# Patient Record
Sex: MALE | Race: WHITE | NOT HISPANIC OR LATINO
[De-identification: names, ages, dates, MRNs, and addresses within clinical notes are randomized per-mention and may not be internally consistent; named-entity substitution may affect disease eponyms.]

---

## 2017-03-27 ENCOUNTER — RX RENEWAL (OUTPATIENT)
Age: 67
End: 2017-03-27

## 2017-06-05 ENCOUNTER — INPATIENT (INPATIENT)
Facility: HOSPITAL | Age: 67
LOS: 1 days | Discharge: HOME | DRG: 378 | End: 2017-06-07
Attending: STUDENT IN AN ORGANIZED HEALTH CARE EDUCATION/TRAINING PROGRAM | Admitting: STUDENT IN AN ORGANIZED HEALTH CARE EDUCATION/TRAINING PROGRAM
Payer: MEDICARE

## 2017-06-05 VITALS
RESPIRATION RATE: 18 BRPM | SYSTOLIC BLOOD PRESSURE: 126 MMHG | HEART RATE: 125 BPM | DIASTOLIC BLOOD PRESSURE: 90 MMHG | TEMPERATURE: 99 F | WEIGHT: 190.04 LBS | OXYGEN SATURATION: 100 % | HEIGHT: 72 IN

## 2017-06-05 LAB
ALBUMIN SERPL ELPH-MCNC: 3.1 G/DL — LOW (ref 3.3–5)
ALP SERPL-CCNC: 84 U/L — SIGNIFICANT CHANGE UP (ref 40–120)
ALT FLD-CCNC: 32 U/L — SIGNIFICANT CHANGE UP (ref 10–45)
ANION GAP SERPL CALC-SCNC: 15 MMOL/L — SIGNIFICANT CHANGE UP (ref 5–17)
APTT BLD: 32.2 SEC — SIGNIFICANT CHANGE UP (ref 27.5–37.4)
AST SERPL-CCNC: 59 U/L — HIGH (ref 10–40)
BASOPHILS NFR BLD AUTO: 0.2 % — SIGNIFICANT CHANGE UP (ref 0–2)
BILIRUB SERPL-MCNC: 2.9 MG/DL — HIGH (ref 0.2–1.2)
BLD GP AB SCN SERPL QL: NEGATIVE — SIGNIFICANT CHANGE UP
BUN SERPL-MCNC: 31 MG/DL — HIGH (ref 7–23)
CALCIUM SERPL-MCNC: 8.8 MG/DL — SIGNIFICANT CHANGE UP (ref 8.4–10.5)
CHLORIDE SERPL-SCNC: 105 MMOL/L — SIGNIFICANT CHANGE UP (ref 96–108)
CO2 SERPL-SCNC: 24 MMOL/L — SIGNIFICANT CHANGE UP (ref 22–31)
CREAT SERPL-MCNC: 0.6 MG/DL — SIGNIFICANT CHANGE UP (ref 0.5–1.3)
EOSINOPHIL NFR BLD AUTO: 0.1 % — SIGNIFICANT CHANGE UP (ref 0–6)
GLUCOSE SERPL-MCNC: 129 MG/DL — HIGH (ref 70–99)
HCT VFR BLD CALC: 29.3 % — LOW (ref 39–50)
HGB BLD-MCNC: 9.8 G/DL — LOW (ref 13–17)
INR BLD: 1.64 — HIGH (ref 0.88–1.16)
LACTATE SERPL-SCNC: 2.5 MMOL/L — HIGH (ref 0.5–2)
LYMPHOCYTES # BLD AUTO: 15.4 % — SIGNIFICANT CHANGE UP (ref 13–44)
MCHC RBC-ENTMCNC: 32.9 PG — SIGNIFICANT CHANGE UP (ref 27–34)
MCHC RBC-ENTMCNC: 33.4 G/DL — SIGNIFICANT CHANGE UP (ref 32–36)
MCV RBC AUTO: 98.3 FL — SIGNIFICANT CHANGE UP (ref 80–100)
MONOCYTES NFR BLD AUTO: 10.9 % — SIGNIFICANT CHANGE UP (ref 2–14)
NEUTROPHILS NFR BLD AUTO: 73.4 % — SIGNIFICANT CHANGE UP (ref 43–77)
PLATELET # BLD AUTO: 145 K/UL — LOW (ref 150–400)
POTASSIUM SERPL-MCNC: 3.7 MMOL/L — SIGNIFICANT CHANGE UP (ref 3.5–5.3)
POTASSIUM SERPL-SCNC: 3.7 MMOL/L — SIGNIFICANT CHANGE UP (ref 3.5–5.3)
PROT SERPL-MCNC: 6.6 G/DL — SIGNIFICANT CHANGE UP (ref 6–8.3)
PROTHROM AB SERPL-ACNC: 18.4 SEC — HIGH (ref 9.8–12.7)
RBC # BLD: 2.98 M/UL — LOW (ref 4.2–5.8)
RBC # FLD: 13.4 % — SIGNIFICANT CHANGE UP (ref 10.3–16.9)
RH IG SCN BLD-IMP: POSITIVE — SIGNIFICANT CHANGE UP
SODIUM SERPL-SCNC: 144 MMOL/L — SIGNIFICANT CHANGE UP (ref 135–145)
WBC # BLD: 11 K/UL — HIGH (ref 3.8–10.5)
WBC # FLD AUTO: 11 K/UL — HIGH (ref 3.8–10.5)

## 2017-06-05 PROCEDURE — 93010 ELECTROCARDIOGRAM REPORT: CPT

## 2017-06-05 PROCEDURE — 74022 RADEX COMPL AQT ABD SERIES: CPT | Mod: 26

## 2017-06-05 PROCEDURE — 99285 EMERGENCY DEPT VISIT HI MDM: CPT | Mod: 25

## 2017-06-05 RX ORDER — SODIUM CHLORIDE 9 MG/ML
1000 INJECTION INTRAMUSCULAR; INTRAVENOUS; SUBCUTANEOUS
Qty: 0 | Refills: 0 | Status: DISCONTINUED | OUTPATIENT
Start: 2017-06-05 | End: 2017-06-06

## 2017-06-05 RX ORDER — SODIUM CHLORIDE 9 MG/ML
1000 INJECTION INTRAMUSCULAR; INTRAVENOUS; SUBCUTANEOUS ONCE
Qty: 0 | Refills: 0 | Status: COMPLETED | OUTPATIENT
Start: 2017-06-05 | End: 2017-06-05

## 2017-06-05 RX ORDER — PANTOPRAZOLE SODIUM 20 MG/1
80 TABLET, DELAYED RELEASE ORAL ONCE
Qty: 0 | Refills: 0 | Status: COMPLETED | OUTPATIENT
Start: 2017-06-05 | End: 2017-06-05

## 2017-06-05 RX ORDER — ONDANSETRON 8 MG/1
4 TABLET, FILM COATED ORAL ONCE
Qty: 0 | Refills: 0 | Status: COMPLETED | OUTPATIENT
Start: 2017-06-05 | End: 2017-06-05

## 2017-06-05 RX ORDER — PANTOPRAZOLE SODIUM 20 MG/1
8 TABLET, DELAYED RELEASE ORAL
Qty: 80 | Refills: 0 | Status: DISCONTINUED | OUTPATIENT
Start: 2017-06-05 | End: 2017-06-06

## 2017-06-05 RX ADMIN — SODIUM CHLORIDE 1000 MILLILITER(S): 9 INJECTION INTRAMUSCULAR; INTRAVENOUS; SUBCUTANEOUS at 22:10

## 2017-06-05 RX ADMIN — ONDANSETRON 4 MILLIGRAM(S): 8 TABLET, FILM COATED ORAL at 22:10

## 2017-06-05 RX ADMIN — SODIUM CHLORIDE 125 MILLILITER(S): 9 INJECTION INTRAMUSCULAR; INTRAVENOUS; SUBCUTANEOUS at 21:31

## 2017-06-05 RX ADMIN — PANTOPRAZOLE SODIUM 80 MILLIGRAM(S): 20 TABLET, DELAYED RELEASE ORAL at 22:10

## 2017-06-05 RX ADMIN — PANTOPRAZOLE SODIUM 10 MG/HR: 20 TABLET, DELAYED RELEASE ORAL at 22:47

## 2017-06-05 NOTE — ED PROVIDER NOTE - DIAGNOSTIC INTERPRETATION
ER Physician: Zuleima Lutz,   CHEST XRAY INTERPRETATION: lungs clear, heart shadow normal, bony structures intact. no free air  ER Physician: Zuleima Lutz,   ABDOMINAL XRAY INTERPRETATION:  nonspecific gas pattern, no free air noted, no apparent hepatomegaly

## 2017-06-05 NOTE — ED PROVIDER NOTE - OBJECTIVE STATEMENT
Pt with PMHx HTN, BPH, no PSHx p/w dark brown vomitus and BMs, onset yesterday. Pt reports 6-7 episodes each of dark brown vomit and watery diarrhea yesterday, and additional 2 episodes today. No abdominal pain. No f/c. Pt attributes his sx to eating "bad Japanese food" 2 nights ago, although admits other people at the same food and are not sick, as far as he's aware. No hx GI bleeding. No AC use. Pt does not take daily ASA. No hx known ulcerative disease. Pt reports he had screening colonoscopy 5/26 with GI Dr Andrew. Pt reports he had two benign polyps removed. No CP, SOB, f/c, URI or  sx. Pt reports he felt gen weakness and a little confused today.

## 2017-06-05 NOTE — ED ADULT NURSE NOTE - OBJECTIVE STATEMENT
pt aaox3, accompanies by daughter, c/o diarrhea and emesis x 2 day. Pt states yesterday he has several episodes of vomiting with blood present. Pt also states he has dark brown diarrhea for the past 2 days. Pt denies chest pain, sob, abd pain, numbness/tingling down extremities.

## 2017-06-05 NOTE — ED PROVIDER NOTE - MEDICAL DECISION MAKING DETAILS
Pt p/w dark brown vomit and stools. No abdominal pain, benign abd, no acute EKG changes or evidence of AF. No gross red blood on exam, no active bleeding noted, guaiac + stool. Residual blood in stool s/o coloscopy possible, but does not explain dark brown vomitus. Will start PPI bolus / drip, antiemetics / IVF. Given recent coloscopy, get acute abd XR series. T&C blood and place on hold at this time. Anticipate admission for serial H/H, possible repeat colonoscopy, will d/w GI.

## 2017-06-05 NOTE — ED ADULT TRIAGE NOTE - CHIEF COMPLAINT QUOTE
Pt presents with c/o DIARRHEA, VOMITING, with suspected blood, for 2 days after eating questionable fish dinner.

## 2017-06-05 NOTE — ED PROVIDER NOTE - PROGRESS NOTE DETAILS
Requested GI to be paged D/w GI fellow - Dr Andrew private attending, requesting he be contacted directly Pt now c/o mild abdominal cramping, intermittent. Repeat exam - abd soft, ND. + mild diffuse lower abdominal ttp. no guarding / rebound / rigidity. Repeat labs showing normalizing lactate. Repeat CBC pending. However given new pain, mild anemia, will get CTA a/p. No episodes of vomiting or BMs while in the ED. No answering service at Dr Andrew's office. Message left by THOMAS Chavez on Dr Andrew's cell: [phone]. No response. Will continue to try to contact Pt reports he is feeling better. Drop in Hct from 29 to 24 s/p 1 unit of NS. Pt consented for blood. Will give 1 unit of blood at this time. Radiology resident reviewing CT at this time. No bleeding noted. + diverticulitis Left additional message at this time for Dr Andrew regarding this pt Radiology: no diverticulitis. + extensive diverticulosis. + thickening of rectum and cecum. mesenteric edema, may be panniculitis. no mesenteric ischemia. no active bleeding

## 2017-06-05 NOTE — ED PROVIDER NOTE - GASTROINTESTINAL, MLM
Abd soft, NT, ND, NABS. No guarding, rebound, or rigidity. No pulsatile abdominal masses. No organomegaly appreciated. Rectal: non thrombosed external hemorrhoids. no internal hemorrhoids or masses appreciated. no gross blood. no stool in vault. guaiac +

## 2017-06-06 DIAGNOSIS — D68.9 COAGULATION DEFECT, UNSPECIFIED: ICD-10-CM

## 2017-06-06 DIAGNOSIS — Z29.9 ENCOUNTER FOR PROPHYLACTIC MEASURES, UNSPECIFIED: ICD-10-CM

## 2017-06-06 DIAGNOSIS — D68.8 OTHER SPECIFIED COAGULATION DEFECTS: ICD-10-CM

## 2017-06-06 DIAGNOSIS — N40.0 BENIGN PROSTATIC HYPERPLASIA WITHOUT LOWER URINARY TRACT SYMPTOMS: ICD-10-CM

## 2017-06-06 DIAGNOSIS — R79.1 ABNORMAL COAGULATION PROFILE: ICD-10-CM

## 2017-06-06 DIAGNOSIS — D64.9 ANEMIA, UNSPECIFIED: ICD-10-CM

## 2017-06-06 DIAGNOSIS — I10 ESSENTIAL (PRIMARY) HYPERTENSION: ICD-10-CM

## 2017-06-06 DIAGNOSIS — R63.8 OTHER SYMPTOMS AND SIGNS CONCERNING FOOD AND FLUID INTAKE: ICD-10-CM

## 2017-06-06 DIAGNOSIS — D69.6 THROMBOCYTOPENIA, UNSPECIFIED: ICD-10-CM

## 2017-06-06 DIAGNOSIS — E80.6 OTHER DISORDERS OF BILIRUBIN METABOLISM: ICD-10-CM

## 2017-06-06 DIAGNOSIS — K52.9 NONINFECTIVE GASTROENTERITIS AND COLITIS, UNSPECIFIED: ICD-10-CM

## 2017-06-06 DIAGNOSIS — F10.10 ALCOHOL ABUSE, UNCOMPLICATED: ICD-10-CM

## 2017-06-06 LAB
ALBUMIN SERPL ELPH-MCNC: 3.2 G/DL — LOW (ref 3.3–5)
ALP SERPL-CCNC: 84 U/L — SIGNIFICANT CHANGE UP (ref 40–120)
ALT FLD-CCNC: 29 U/L — SIGNIFICANT CHANGE UP (ref 10–45)
ANION GAP SERPL CALC-SCNC: 13 MMOL/L — SIGNIFICANT CHANGE UP (ref 5–17)
APTT BLD: 31.3 SEC — SIGNIFICANT CHANGE UP (ref 27.5–37.4)
AST SERPL-CCNC: 64 U/L — HIGH (ref 10–40)
BASOPHILS NFR BLD AUTO: 0.3 % — SIGNIFICANT CHANGE UP (ref 0–2)
BASOPHILS NFR BLD AUTO: 1 % — SIGNIFICANT CHANGE UP (ref 0–2)
BILIRUB DIRECT SERPL-MCNC: 0.8 MG/DL — HIGH (ref 0–0.2)
BILIRUB DIRECT SERPL-MCNC: 0.9 MG/DL — HIGH (ref 0–0.2)
BILIRUB INDIRECT FLD-MCNC: 1.7 MG/DL — HIGH (ref 0.2–1)
BILIRUB SERPL-MCNC: 2.6 MG/DL — HIGH (ref 0.2–1.2)
BLD GP AB SCN SERPL QL: NEGATIVE — SIGNIFICANT CHANGE UP
BLD GP AB SCN SERPL QL: NEGATIVE — SIGNIFICANT CHANGE UP
BUN SERPL-MCNC: 27 MG/DL — HIGH (ref 7–23)
CALCIUM SERPL-MCNC: 7.8 MG/DL — LOW (ref 8.4–10.5)
CHLORIDE SERPL-SCNC: 111 MMOL/L — HIGH (ref 96–108)
CO2 SERPL-SCNC: 22 MMOL/L — SIGNIFICANT CHANGE UP (ref 22–31)
CREAT SERPL-MCNC: 0.6 MG/DL — SIGNIFICANT CHANGE UP (ref 0.5–1.3)
D DIMER BLD IA.RAPID-MCNC: 454 NG/ML DDU — HIGH
DOHLE BOD BLD QL SMEAR: SIGNIFICANT CHANGE UP
EOSINOPHIL NFR BLD AUTO: 0.6 % — SIGNIFICANT CHANGE UP (ref 0–6)
EXTRA LAVENDER TOP TUBE: SIGNIFICANT CHANGE UP
FERRITIN SERPL-MCNC: 105.5 NG/ML — SIGNIFICANT CHANGE UP (ref 30–400)
FIBRINOGEN PPP-MCNC: 164 MG/DL — LOW (ref 258–438)
GLUCOSE SERPL-MCNC: 100 MG/DL — HIGH (ref 70–99)
HAPTOGLOB SERPL-MCNC: 24 MG/DL — LOW (ref 34–200)
HAPTOGLOB SERPL-MCNC: 28 MG/DL — LOW (ref 34–200)
HBV SURFACE AG SER-ACNC: SIGNIFICANT CHANGE UP
HCT VFR BLD CALC: 24.3 % — LOW (ref 39–50)
HCT VFR BLD CALC: 24.8 % — LOW (ref 39–50)
HCT VFR BLD CALC: 25.4 % — LOW (ref 39–50)
HCT VFR BLD CALC: 26.1 % — LOW (ref 39–50)
HCV AB S/CO SERPL IA: 0.19 S/CO — SIGNIFICANT CHANGE UP
HCV AB S/CO SERPL IA: 0.2 S/CO — SIGNIFICANT CHANGE UP
HCV AB SERPL-IMP: SIGNIFICANT CHANGE UP
HCV AB SERPL-IMP: SIGNIFICANT CHANGE UP
HGB BLD-MCNC: 8.1 G/DL — LOW (ref 13–17)
HGB BLD-MCNC: 8.2 G/DL — LOW (ref 13–17)
HGB BLD-MCNC: 8.3 G/DL — LOW (ref 13–17)
HGB BLD-MCNC: 8.7 G/DL — LOW (ref 13–17)
HYPOCHROMIA BLD QL: SLIGHT — SIGNIFICANT CHANGE UP
INR BLD: 1.51 — HIGH (ref 0.88–1.16)
IRON SATN MFR SERPL: 277 UG/DL — HIGH (ref 45–165)
IRON SATN MFR SERPL: 92 % — HIGH (ref 16–55)
LACTATE SERPL-SCNC: 1.8 MMOL/L — SIGNIFICANT CHANGE UP (ref 0.5–2)
LDH SERPL L TO P-CCNC: 210 U/L — SIGNIFICANT CHANGE UP (ref 50–242)
LDH SERPL L TO P-CCNC: 259 U/L — HIGH (ref 50–242)
LYMPHOCYTES # BLD AUTO: 19 % — SIGNIFICANT CHANGE UP (ref 13–44)
LYMPHOCYTES # BLD AUTO: 23.2 % — SIGNIFICANT CHANGE UP (ref 13–44)
MAGNESIUM SERPL-MCNC: 1.4 MG/DL — LOW (ref 1.6–2.6)
MANUAL DIF COMMENT BLD-IMP: SIGNIFICANT CHANGE UP
MANUAL SMEAR VERIFICATION: YES — SIGNIFICANT CHANGE UP
MCHC RBC-ENTMCNC: 32.4 PG — SIGNIFICANT CHANGE UP (ref 27–34)
MCHC RBC-ENTMCNC: 32.7 G/DL — SIGNIFICANT CHANGE UP (ref 32–36)
MCHC RBC-ENTMCNC: 32.7 PG — SIGNIFICANT CHANGE UP (ref 27–34)
MCHC RBC-ENTMCNC: 32.7 PG — SIGNIFICANT CHANGE UP (ref 27–34)
MCHC RBC-ENTMCNC: 32.8 PG — SIGNIFICANT CHANGE UP (ref 27–34)
MCHC RBC-ENTMCNC: 33.1 G/DL — SIGNIFICANT CHANGE UP (ref 32–36)
MCHC RBC-ENTMCNC: 33.3 G/DL — SIGNIFICANT CHANGE UP (ref 32–36)
MCHC RBC-ENTMCNC: 33.3 G/DL — SIGNIFICANT CHANGE UP (ref 32–36)
MCV RBC AUTO: 98 FL — SIGNIFICANT CHANGE UP (ref 80–100)
MCV RBC AUTO: 98.5 FL — SIGNIFICANT CHANGE UP (ref 80–100)
MCV RBC AUTO: 98.8 FL — SIGNIFICANT CHANGE UP (ref 80–100)
MCV RBC AUTO: 99.2 FL — SIGNIFICANT CHANGE UP (ref 80–100)
MONOCYTES NFR BLD AUTO: 11.9 % — SIGNIFICANT CHANGE UP (ref 2–14)
MONOCYTES NFR BLD AUTO: 7 % — SIGNIFICANT CHANGE UP (ref 2–14)
NEUTROPHILS NFR BLD AUTO: 64 % — SIGNIFICANT CHANGE UP (ref 43–77)
NEUTROPHILS NFR BLD AUTO: 68 % — SIGNIFICANT CHANGE UP (ref 43–77)
NEUTS BAND # BLD: 5 % — SIGNIFICANT CHANGE UP
PLAT MORPH BLD: NORMAL — SIGNIFICANT CHANGE UP
PLATELET # BLD AUTO: 101 K/UL — LOW (ref 150–400)
PLATELET # BLD AUTO: 113 K/UL — LOW (ref 150–400)
PLATELET # BLD AUTO: 125 K/UL — LOW (ref 150–400)
PLATELET # BLD AUTO: 128 K/UL — LOW (ref 150–400)
POLYCHROMASIA BLD QL SMEAR: SLIGHT — SIGNIFICANT CHANGE UP
POTASSIUM SERPL-MCNC: 3.7 MMOL/L — SIGNIFICANT CHANGE UP (ref 3.5–5.3)
POTASSIUM SERPL-SCNC: 3.7 MMOL/L — SIGNIFICANT CHANGE UP (ref 3.5–5.3)
PROT SERPL-MCNC: 6.6 G/DL — SIGNIFICANT CHANGE UP (ref 6–8.3)
PROTHROM AB SERPL-ACNC: 16.9 SEC — HIGH (ref 9.8–12.7)
RBC # BLD: 2.48 M/UL — LOW (ref 4.2–5.8)
RBC # BLD: 2.51 M/UL — LOW (ref 4.2–5.8)
RBC # BLD: 2.56 M/UL — LOW (ref 4.2–5.8)
RBC # BLD: 2.56 M/UL — LOW (ref 4.2–5.8)
RBC # BLD: 2.65 M/UL — LOW (ref 4.2–5.8)
RBC # FLD: 13.3 % — SIGNIFICANT CHANGE UP (ref 10.3–16.9)
RBC # FLD: 13.5 % — SIGNIFICANT CHANGE UP (ref 10.3–16.9)
RBC # FLD: 13.6 % — SIGNIFICANT CHANGE UP (ref 10.3–16.9)
RBC # FLD: 13.6 % — SIGNIFICANT CHANGE UP (ref 10.3–16.9)
RBC BLD AUTO: (no result)
RETICS/RBC NFR: 4.6 % — HIGH (ref 0.5–2.5)
RH IG SCN BLD-IMP: POSITIVE — SIGNIFICANT CHANGE UP
SODIUM SERPL-SCNC: 146 MMOL/L — HIGH (ref 135–145)
TIBC SERPL-MCNC: 302 UG/DL — SIGNIFICANT CHANGE UP (ref 220–430)
TRANSFERRIN SERPL-MCNC: 216 MG/DL — SIGNIFICANT CHANGE UP (ref 200–360)
TRANSFERRIN SERPL-MCNC: 246 MG/DL — SIGNIFICANT CHANGE UP (ref 200–360)
UIBC SERPL-MCNC: 25 UG/DL — LOW (ref 110–370)
WBC # BLD: 10.1 K/UL — SIGNIFICANT CHANGE UP (ref 3.8–10.5)
WBC # BLD: 10.2 K/UL — SIGNIFICANT CHANGE UP (ref 3.8–10.5)
WBC # BLD: 10.6 K/UL — HIGH (ref 3.8–10.5)
WBC # BLD: 11.4 K/UL — HIGH (ref 3.8–10.5)
WBC # FLD AUTO: 10.1 K/UL — SIGNIFICANT CHANGE UP (ref 3.8–10.5)
WBC # FLD AUTO: 10.2 K/UL — SIGNIFICANT CHANGE UP (ref 3.8–10.5)
WBC # FLD AUTO: 10.6 K/UL — HIGH (ref 3.8–10.5)
WBC # FLD AUTO: 11.4 K/UL — HIGH (ref 3.8–10.5)

## 2017-06-06 PROCEDURE — 74174 CTA ABD&PLVS W/CONTRAST: CPT | Mod: 26

## 2017-06-06 PROCEDURE — 99223 1ST HOSP IP/OBS HIGH 75: CPT | Mod: AI

## 2017-06-06 PROCEDURE — 93306 TTE W/DOPPLER COMPLETE: CPT | Mod: 26

## 2017-06-06 PROCEDURE — 76700 US EXAM ABDOM COMPLETE: CPT | Mod: 26

## 2017-06-06 RX ORDER — CIPROFLOXACIN LACTATE 400MG/40ML
400 VIAL (ML) INTRAVENOUS ONCE
Qty: 0 | Refills: 0 | Status: DISCONTINUED | OUTPATIENT
Start: 2017-06-06 | End: 2017-06-06

## 2017-06-06 RX ORDER — CEFTRIAXONE 500 MG/1
1 INJECTION, POWDER, FOR SOLUTION INTRAMUSCULAR; INTRAVENOUS ONCE
Qty: 0 | Refills: 0 | Status: COMPLETED | OUTPATIENT
Start: 2017-06-06 | End: 2017-06-06

## 2017-06-06 RX ORDER — TAMSULOSIN HYDROCHLORIDE 0.4 MG/1
0.4 CAPSULE ORAL AT BEDTIME
Qty: 0 | Refills: 0 | Status: DISCONTINUED | OUTPATIENT
Start: 2017-06-06 | End: 2017-06-07

## 2017-06-06 RX ORDER — PANTOPRAZOLE SODIUM 20 MG/1
40 TABLET, DELAYED RELEASE ORAL EVERY 12 HOURS
Qty: 0 | Refills: 0 | Status: DISCONTINUED | OUTPATIENT
Start: 2017-06-06 | End: 2017-06-06

## 2017-06-06 RX ORDER — METRONIDAZOLE 500 MG
500 TABLET ORAL ONCE
Qty: 0 | Refills: 0 | Status: DISCONTINUED | OUTPATIENT
Start: 2017-06-06 | End: 2017-06-06

## 2017-06-06 RX ORDER — SODIUM CHLORIDE 9 MG/ML
500 INJECTION INTRAMUSCULAR; INTRAVENOUS; SUBCUTANEOUS ONCE
Qty: 0 | Refills: 0 | Status: COMPLETED | OUTPATIENT
Start: 2017-06-06 | End: 2017-06-06

## 2017-06-06 RX ORDER — POTASSIUM CHLORIDE 20 MEQ
40 PACKET (EA) ORAL ONCE
Qty: 0 | Refills: 0 | Status: COMPLETED | OUTPATIENT
Start: 2017-06-06 | End: 2017-06-06

## 2017-06-06 RX ORDER — MAGNESIUM SULFATE 500 MG/ML
2 VIAL (ML) INJECTION ONCE
Qty: 0 | Refills: 0 | Status: COMPLETED | OUTPATIENT
Start: 2017-06-06 | End: 2017-06-06

## 2017-06-06 RX ORDER — PANTOPRAZOLE SODIUM 20 MG/1
8 TABLET, DELAYED RELEASE ORAL
Qty: 80 | Refills: 0 | Status: DISCONTINUED | OUTPATIENT
Start: 2017-06-06 | End: 2017-06-06

## 2017-06-06 RX ORDER — SODIUM CHLORIDE 9 MG/ML
1000 INJECTION, SOLUTION INTRAVENOUS
Qty: 0 | Refills: 0 | Status: DISCONTINUED | OUTPATIENT
Start: 2017-06-06 | End: 2017-06-06

## 2017-06-06 RX ORDER — SODIUM CHLORIDE 9 MG/ML
1000 INJECTION INTRAMUSCULAR; INTRAVENOUS; SUBCUTANEOUS
Qty: 0 | Refills: 0 | Status: DISCONTINUED | OUTPATIENT
Start: 2017-06-06 | End: 2017-06-07

## 2017-06-06 RX ORDER — OCTREOTIDE ACETATE 200 UG/ML
50 INJECTION, SOLUTION INTRAVENOUS; SUBCUTANEOUS
Qty: 500 | Refills: 0 | Status: DISCONTINUED | OUTPATIENT
Start: 2017-06-06 | End: 2017-06-07

## 2017-06-06 RX ORDER — OCTREOTIDE ACETATE 200 UG/ML
50 INJECTION, SOLUTION INTRAVENOUS; SUBCUTANEOUS
Qty: 500 | Refills: 0 | Status: DISCONTINUED | OUTPATIENT
Start: 2017-06-06 | End: 2017-06-06

## 2017-06-06 RX ORDER — OCTREOTIDE ACETATE 200 UG/ML
50 INJECTION, SOLUTION INTRAVENOUS; SUBCUTANEOUS ONCE
Qty: 0 | Refills: 0 | Status: COMPLETED | OUTPATIENT
Start: 2017-06-06 | End: 2017-06-06

## 2017-06-06 RX ORDER — PANTOPRAZOLE SODIUM 20 MG/1
40 TABLET, DELAYED RELEASE ORAL EVERY 12 HOURS
Qty: 0 | Refills: 0 | Status: DISCONTINUED | OUTPATIENT
Start: 2017-06-06 | End: 2017-06-07

## 2017-06-06 RX ORDER — SODIUM CHLORIDE 9 MG/ML
1000 INJECTION INTRAMUSCULAR; INTRAVENOUS; SUBCUTANEOUS ONCE
Qty: 0 | Refills: 0 | Status: COMPLETED | OUTPATIENT
Start: 2017-06-06 | End: 2017-06-06

## 2017-06-06 RX ORDER — CEFTRIAXONE 500 MG/1
1 INJECTION, POWDER, FOR SOLUTION INTRAMUSCULAR; INTRAVENOUS EVERY 24 HOURS
Qty: 0 | Refills: 0 | Status: DISCONTINUED | OUTPATIENT
Start: 2017-06-07 | End: 2017-06-07

## 2017-06-06 RX ORDER — CEFTRIAXONE 500 MG/1
INJECTION, POWDER, FOR SOLUTION INTRAMUSCULAR; INTRAVENOUS
Qty: 0 | Refills: 0 | Status: DISCONTINUED | OUTPATIENT
Start: 2017-06-06 | End: 2017-06-07

## 2017-06-06 RX ADMIN — SODIUM CHLORIDE 125 MILLILITER(S): 9 INJECTION, SOLUTION INTRAVENOUS at 09:27

## 2017-06-06 RX ADMIN — PANTOPRAZOLE SODIUM 40 MILLIGRAM(S): 20 TABLET, DELAYED RELEASE ORAL at 18:51

## 2017-06-06 RX ADMIN — PANTOPRAZOLE SODIUM 10 MG/HR: 20 TABLET, DELAYED RELEASE ORAL at 15:10

## 2017-06-06 RX ADMIN — OCTREOTIDE ACETATE 10 MICROGRAM(S)/HR: 200 INJECTION, SOLUTION INTRAVENOUS; SUBCUTANEOUS at 15:30

## 2017-06-06 RX ADMIN — Medication 40 MILLIEQUIVALENT(S): at 07:49

## 2017-06-06 RX ADMIN — TAMSULOSIN HYDROCHLORIDE 0.4 MILLIGRAM(S): 0.4 CAPSULE ORAL at 21:23

## 2017-06-06 RX ADMIN — OCTREOTIDE ACETATE 50 MICROGRAM(S): 200 INJECTION, SOLUTION INTRAVENOUS; SUBCUTANEOUS at 14:47

## 2017-06-06 RX ADMIN — OCTREOTIDE ACETATE 10 MICROGRAM(S)/HR: 200 INJECTION, SOLUTION INTRAVENOUS; SUBCUTANEOUS at 18:52

## 2017-06-06 RX ADMIN — CEFTRIAXONE 100 GRAM(S): 500 INJECTION, POWDER, FOR SOLUTION INTRAMUSCULAR; INTRAVENOUS at 18:49

## 2017-06-06 RX ADMIN — Medication 50 GRAM(S): at 07:49

## 2017-06-06 RX ADMIN — SODIUM CHLORIDE 1000 MILLILITER(S): 9 INJECTION INTRAMUSCULAR; INTRAVENOUS; SUBCUTANEOUS at 21:23

## 2017-06-06 RX ADMIN — SODIUM CHLORIDE 100 MILLILITER(S): 9 INJECTION INTRAMUSCULAR; INTRAVENOUS; SUBCUTANEOUS at 21:24

## 2017-06-06 RX ADMIN — SODIUM CHLORIDE 4000 MILLILITER(S): 9 INJECTION INTRAMUSCULAR; INTRAVENOUS; SUBCUTANEOUS at 04:37

## 2017-06-06 NOTE — H&P ADULT - PROBLEM SELECTOR PLAN 2
Pt was found to be anemic to 9.8 with an unknown baseline and does not know if he was ever anemic before.  After 1L NS his hgb dropped to 8.1 but he had no further episodes of diarrhea or vomiting while here so unclear if this is an actual drop or if they may have been drawn near fluids. He also has history of ETOH abuse for many years so may have a low baseline hgb as it is due to folate deficiency or even bone marrow suppression especially in the setting of cirrhotic changes on CT and thrombocytopenia.  There is no evidence of acute bleed on 2 rectal exams and a CTA.   -Repeat CBC  -No need for blood transfusion at this time  -Check Iron studies  -Check B12 and Folate and MMA

## 2017-06-06 NOTE — PROGRESS NOTE ADULT - PROBLEM SELECTOR PLAN 6
Thrombocytopenia with an unkown baseline may be 2/2 heavy alcohol use with bone marrow supression.  Will check peripheral smear to r/o other etiologies but given the history this is most likely.  Will also get collateral from PMD in am. Thrombocytopenia with an unkown baseline may be 2/2 heavy alcohol use with bone marrow supression.    -Will check peripheral smear to r/o other etiologies but given the history this is most likely.    -Will also get collateral from PMD

## 2017-06-06 NOTE — PROGRESS NOTE ADULT - PROBLEM SELECTOR PLAN 10
Clear liquid diet, keep mg >2 K > 4, NS @125mls/hr Clear liquid diet, keep mg >2 K > 4, LR @125mls/hr    #FULL CODE

## 2017-06-06 NOTE — CONSULT NOTE ADULT - PROBLEM SELECTOR RECOMMENDATION 4
etiology 2/2 to underlying liver disease. no shistocytes on peripheral smear suggestive of microangiopathic process. cont to monitor

## 2017-06-06 NOTE — H&P ADULT - PROBLEM SELECTOR PLAN 4
Hyperbilirubinemia of 2.5 with unknown baseline. Will check LDH and haptoglobin especially in setting of anemia to determine if there is a component of hemolyisis.  -Repeat CMP and trend.  -Add on direct/indirect bilirubin

## 2017-06-06 NOTE — H&P ADULT - PROBLEM SELECTOR PLAN 6
Thrombocytopenia with an unkown baseline may be 2/2 heavy alcohol use with bone marrow supression.  Will check peripheral smear to r/o other etiologies but given the history this is most likely.  Will also get collateral from PMD in am.

## 2017-06-06 NOTE — H&P ADULT - ATTENDING COMMENTS
Pt seen and examined by me at bedside. Agree with armando's exam/a/p as noted above with additions, +one episode of melenotic stool since last seen as per HS. labs reviewed. CT a/p reviewed.  VSS, exam as above.  a/p:  1. GIB in the setting of cirrhosis (likely ETOH induced): Appreciate GI consult, scope on AM, on SBP ppx,   2. Anemia likely multifactorial: heme following.  3. Coagulopathy/thrombocytopenia: likely 2/2 cirrhosis  4. HTN: ok to hold off anti-htn in the setting of GIB  rest of a/p as above.

## 2017-06-06 NOTE — DISCHARGE NOTE ADULT - ADDITIONAL INSTRUCTIONS
Follow up with the physicians as above.  If you develop recurrence of your symptoms, chest pain, shortness of breath, palpitations, nausea, vomiting, diarrhea, bloody stool,  bloody cough, bloody vomit, fever, or chills please go to the emergency department. If you notice any acute weakness, facial droop, vision changes, or headache, please go to the emergency department.

## 2017-06-06 NOTE — H&P ADULT - NSHPPHYSICALEXAM_GEN_ALL_CORE
PHYSICAL EXAM:  ICU Vital Signs Last 24 Hrs  T(C): 37.4, Max: 37.4 (06-05 @ 20:29)  T(F): 99.4, Max: 99.4 (06-05 @ 20:29)  HR: 85 (85 - 125)  BP: 144/83 (126/90 - 145/99)  BP(mean): --  ABP: --  ABP(mean): --  RR: 18 (18 - 18)  SpO2: 100% (100% - 100%)    Orthostatics:   Supine HR 81 /88   Standing  /67      Constitutional: NAD, comfortable, speaking clearly on exam answering questions appropriately     Eyes: Red sclera    ENMT: Dry MMM, halitosis, no JVD  Respiratory: CTAB no rales or wheezing  Cardiovascular: RRR no murmurs  Gastrointestinal: Soft NTND normal bowel sounds no rebound or guarding  Genitourinary: No ladd  Rectal: Multiple external hemorroids, 1 of which looks like its bleeding, No stool in vault, good rectal tone, brown stool smears.  Extremities: WWP with 2+ edema and chronic statis discoloration

## 2017-06-06 NOTE — PROGRESS NOTE ADULT - PROBLEM SELECTOR PLAN 1
In the setting of eating fish. The patient at fish on Saturday and had the most severe symptoms on Sunday and started to feel a little bit better on Monday. Today he is much improved. Last episode n/v/d was yesterday x 1 in the ED. Likely viral vs toxin induced colitis. Low suspicion for bacterial given lack of abdomen pain, hematochezia, melena, travel, fever, or white count.   -No need to continue abx given above.   -Was orthostatic positive in ED which resolved after 1L NS bolus and NS at 125 cc/hr. Patient with hyperchloremic metabolic acidosis -- switched to LR (can likely d/c later).  -Patient tolerating diet -- continue to advance.

## 2017-06-06 NOTE — H&P ADULT - NSHPREVIEWOFSYSTEMS_GEN_ALL_CORE
REVIEW OF SYSTEMS:    CONSTITUTIONAL: No fever, weight loss, or fatigue  EYES: No eye pain, visual disturbances, or discharge  ENMT:  No difficulty hearing, tinnitus, vertigo; No sinus or throat pain  NECK: No pain or stiffness  BREASTS: No pain, masses, or nipple discharge  RESPIRATORY: No cough, wheezing, chills or hemoptysis; No shortness of breath  CARDIOVASCULAR: No chest pain, palpitations, dizziness, or leg swelling  GASTROINTESTINAL: No abdominal or epigastric pain. No nausea, vomiting, or hematemesis; No diarrhea or constipation. No melena or hematochezia.  GENITOURINARY: No dysuria, frequency, hematuria, or incontinence  NEUROLOGICAL: No headaches, memory loss, loss of strength, numbness, or tremors  SKIN: No itching, burning, rashes, or lesions   LYMPH NODES: No enlarged glands  ENDOCRINE: No heat or cold intolerance; No hair loss  MUSCULOSKELETAL: No joint pain or swelling; No muscle, back, or extremity pain  PSYCHIATRIC: No depression, anxiety, mood swings, or difficulty sleeping  HEME/LYMPH: No easy bruising, or bleeding gums  ALLERY AND IMMUNOLOGIC: No hives or eczema

## 2017-06-06 NOTE — DISCHARGE NOTE ADULT - INSTRUCTIONS
DIET RECOMMENDATIONS:  Low salt diet (less than 1500 mg per day).  Low fat diet. Avoid fried foods. Avoid red meat.  Eat a salad a day. Can also eat avocado and almonds with your salad. DIET RECOMMENDATIONS:  Low salt diet (less than 1500 mg per day).  Low fat diet. Avoid fried foods. Avoid red meat.  Eat a salad a day. Can also eat avocado and almonds with your salad.  NO MORE ALCOHOL.

## 2017-06-06 NOTE — DISCHARGE NOTE ADULT - CARE PROVIDER_API CALL
Inocente Bernstein), Hematology; Internal Medicine; Medical Oncology  [address] [place] [address]  Phone: [phone]  Fax: [phone] Inocente Bernstein), Hematology; Internal Medicine; Medical Oncology  [address] [address]  Phone: [phone]  Fax: [phone]    Lopez Andrew), Gastroenterology; Internal Medicine  [address] [address]  Phone: [phone]  Fax: [phone]    YOUR PMD -- DR. CHAPA,   Phone: (   )    -  Fax: (   )    -

## 2017-06-06 NOTE — CONSULT NOTE ADULT - ASSESSMENT
67 year old male with cirrhosis admitted for n/v. Pt found to be anemic -- which may be multifactorial. 67 year old male with cirrhosis admitted for n/v. Pt found to be anemic -- which may be multifactorial 2/2. In the setting of cirrhosis and GI bleed will plan for endoscopic evaluation to determine cause melena.        -Clear liquid diet   -NPO at midnight  -Monitor CBCs, hgb >7 given cirrhosis  -IV PPI BID  -Octreotide   -Ceftriaxone for SBP ppx   -EGD in am.    case d/w Dr. Andrew

## 2017-06-06 NOTE — PROGRESS NOTE ADULT - PROBLEM SELECTOR PLAN 3
Pt drinks 3-6 glasses of wine per day, but denies any w/d symptoms in the past.  He did not drink this week since he has been ill so last drink was Satruday night.  -Social work consult Pt drinks 3-6 glasses of wine per day, but denies any w/d symptoms in the past.  He did not drink this week since he has been ill so last drink was Saturday night.   NO clear signs of withdrawal.   -Social work consult  -Educated patient on dangers of etoh abuse and informed him of CT scan findings.

## 2017-06-06 NOTE — DISCHARGE NOTE ADULT - PLAN OF CARE
Follow up with your primary care doctor as soon as possible. You likely have a viral gastroenteritis or possibly "food poisoning"   This does not require any antibiotics. Continue to drink plenty of water and get some rest. You were found to have a low red blood cell count. This is likely multifactorial. This could be from your drinking habits which can cause vitamine deficiencies and also bone marrow suppression. Our labs shows that you also have some low grade break down of your red blood cells. Your blood level is NOT at a dangerous level and you can likely be discharged for an outpatient follow up with our heme/onc doctor You have signs of scarred liver on the CAT scan. This is most likely from your alcohol use. You stop drinking alcohol until you have follow up imaging of your liver to make sure that the scarring does not progress. Of note, this is also affecting your red blood cells and your platelets which are both low. It is very important for you to stop drinking alcohol given the evidence of liver damage. Low platelets as above. as above. You were found to have a low red blood cell count. This is likely multifactorial. This could be from your drinking habits which can cause vitamine deficiencies and also bone marrow suppression. Our labs shows that you also have some low grade break down of your red blood cells. Your blood level is NOT at a dangerous level and you can likely be discharged for an outpatient follow up with our heme/onc doctor  FOLLOW UP WITH DR. CLEMENTS NEXT WEDNESDAY - JUNE 14TH AT YOUR SCHEDULED APPOINTMENT.     ALSO, PLEASE FOLLOW UP WITH YOUR PRIMARY CARE DR. DR. CHAPA AS SOON AS POSSIBLE. PLEASE CALL TO MAKE AN APPOINTMENT.    *** You were found to have a low red blood cell count. This is likely multifactorial. This could be from your drinking habits which can cause vitamine deficiencies and also bone marrow suppression. Our labs shows that you also have some low grade break down of your red blood cells. Your blood level is NOT at a dangerous level and you can likely be discharged for an outpatient follow up with our heme/onc doctor. Further you did have an endoscopy and they did show that you have small varices which are dilated blood vessels which are prone to bleed and some inflammation of your stomach. There was no active bleeding noted. It is important for you to STOP drinking all together as this is contributing to this. If you continue to drink at the same rate that you have been this will results in worsening of your liver disease and increase your risk of bleeding.     FOLLOW UP WITH DR. CLEMENTS NEXT WEDNESDAY - JUNE 14TH AT YOUR SCHEDULED APPOINTMENT.     ALSO, PLEASE FOLLOW UP WITH YOUR PRIMARY CARE DR. DR. CHAPA AS SOON AS POSSIBLE. PLEASE CALL TO MAKE AN APPOINTMENT.    *** You likely have a viral gastroenteritis or possibly "food poisoning"   This does not require any antibiotics. Continue to drink plenty of water and get some rest.  FOLLOW UP WITH DR. CHAPA AS SOON AS POSSIBLE (PLEASE MAKE AN APPOINTMENT). You were found to have a low red blood cell count. This is likely multifactorial. This could be from your drinking habits which can cause vitamine deficiencies and also bone marrow suppression. Our labs shows that you also have some low grade break down of your red blood cells. Your blood level is NOT at a dangerous level and you can likely be discharged for an outpatient follow up with our heme/onc doctor. Further you did have an endoscopy and they did show that you have small varices which are dilated blood vessels which are prone to bleed and some inflammation of your stomach. There was no active bleeding noted. It is important for you to STOP drinking all together as this is contributing to this. If you continue to drink at the same rate that you have been this will results in worsening of your liver disease and increase your risk of bleeding.     FOLLOW UP WITH DR. CLEMENTS NEXT WEDNESDAY - JUNE 14TH AT YOUR SCHEDULED APPOINTMENT THE NUMBER IS PROVIDED.     ALSO, PLEASE MAKE AN APPOINTMENT WITH Dr. Andrew  AS SOON AS POSSIBLE. NUMBER PROIVDED IN THIS PAPERWORK AS WELL.     *** Stop drinking as above. Your liver can not tolerate it and you can have worsening of your symptoms. We have found your liver disease at an early stage which can be managed easier than late stage disease. You were found to have a low red blood cell count. This is likely multifactorial. This could be from your drinking habits which can cause vitamine deficiencies and also bone marrow suppression. Our labs shows that you also have some low grade break down of your red blood cells. Your blood level is NOT at a dangerous level and you can likely be discharged for an outpatient follow up with our heme/onc doctor. Further you did have an endoscopy and they did show that you have small varices which are dilated blood vessels which are prone to bleed and some inflammation of your stomach. There was no active bleeding noted. It is important for you to STOP drinking all together as this is contributing to this. If you continue to drink at the same rate that you have been this will results in worsening of your liver disease and increase your risk of bleeding.     FOLLOW UP WITH DR. CLEMENTS NEXT WEDNESDAY - JUNE 14TH AT YOUR SCHEDULED APPOINTMENT THE NUMBER IS PROVIDED.     ALSO, PLEASE MAKE AN APPOINTMENT WITH Dr. Andrew  AS SOON AS POSSIBLE. NUMBER PROVIDED IN THIS PAPERWORK AS WELL.     *** refrain from drinking please restart your home medication. you have enlarged vessels on your upper endoscopy and this is likely the cause of your dark stool. please take pantoprazole as prescribed. you have low platelets secondary to cirrhosis you have abnormal coagulopathy secondary to cirrhosis prevent reoccurence

## 2017-06-06 NOTE — CONSULT NOTE ADULT - PROBLEM SELECTOR RECOMMENDATION 2
etiology 2/2 to underlying liver cirrhosis, cont to monitor. no evidence of bleeding etiology 2/2 to underlying liver cirrhosis, cont to monitor. no evidence of bleeding  - check full abd ultrasound to better characterize liver/spleen morphology.

## 2017-06-06 NOTE — CONSULT NOTE ADULT - ASSESSMENT
68 yo M with hx of HTN and BPH presenting with anemia 68 yo M with hx of HTN and BPH presenting with colitis, noted to have anemia, thrombocytopenia and coagulopathy

## 2017-06-06 NOTE — DISCHARGE NOTE ADULT - MEDICATION SUMMARY - MEDICATIONS TO STOP TAKING
I will STOP taking the medications listed below when I get home from the hospital:    enalapril  --  by mouth

## 2017-06-06 NOTE — DISCHARGE NOTE ADULT - MEDICATION SUMMARY - MEDICATIONS TO TAKE
I will START or STAY ON the medications listed below when I get home from the hospital:    lisinopril  --  by mouth   -- Indication: For Hypertension    tamsulosin 0.4 mg oral capsule  -- 1 cap(s) by mouth once a day  -- Indication: For BPH (benign prostatic hypertrophy)    amLODIPine  --  by mouth   -- Indication: For Hypertension    pantoprazole 40 mg oral delayed release tablet  -- 1 tab(s) by mouth once a day  -- It is very important that you take or use this exactly as directed.  Do not skip doses or discontinue unless directed by your doctor.  Obtain medical advice before taking any non-prescription drugs as some may affect the action of this medication.  Swallow whole.  Do not crush.    -- Indication: For portal hypertension induced gastropathy. I will START or STAY ON the medications listed below when I get home from the hospital:    enalapril  --  by mouth   -- Indication: For Htn    tamsulosin 0.4 mg oral capsule  -- 1 cap(s) by mouth once a day  -- Indication: For BPH (benign prostatic hypertrophy)    amLODIPine  --  by mouth   -- Indication: For Hypertension    pantoprazole 40 mg oral delayed release tablet  -- 1 tab(s) by mouth once a day  -- It is very important that you take or use this exactly as directed.  Do not skip doses or discontinue unless directed by your doctor.  Obtain medical advice before taking any non-prescription drugs as some may affect the action of this medication.  Swallow whole.  Do not crush.    -- Indication: For portal hypertension induced gastropathy.

## 2017-06-06 NOTE — PROGRESS NOTE ADULT - PROBLEM SELECTOR PLAN 5
He has elevated coags in setting of acute illness and also long time heavy ETOH use with cirrhotic changes on CT so likely this is multifactorial.  He is not on any blood thinners.    -Monitor coags daily He has elevated coags in setting of acute illness and also long time heavy ETOH use with cirrhotic changes on CT so likely this is multifactorial.  He is not on any blood thinners.    -Monitor coags daily  -Other w/u as above.

## 2017-06-06 NOTE — DISCHARGE NOTE ADULT - HOSPITAL COURSE
67M pmhx of HTN, BPH, and ETOH use disorder (3-6 drinks / day for "whole life") here with likely viral vs toxin induced colitis and anemia with a hemolytic component. Heme/onc consulted -- recs followed. Patient to follow up outpatient with heme/onc after blood is drawn.     On the day of discharge, the patient was seen and examined. Symptoms improved. Vital signs are stable. Labs and imaging reviewed. Patient is medically optimized and hemodynamically stable. Return precautions discussed, medication teach back done, and importance of physician followup. The patient verbalized understanding	. 67M pmhx of HTN, BPH, and ETOH use disorder (3-6 drinks / day for "whole life") here with likely viral vs toxin induced colitis and anemia with a hemolytic component. Heme/onc consulted -- recs followed. Direct tigist test was negative. Rectal exam x 2 negative for gross blood. Guaiac negative. Repeat CBC showing ________________  Patient to follow up outpatient with heme/onc.    On the day of discharge, the patient was seen and examined. Symptoms improved. Vital signs are stable. Labs and imaging reviewed. Patient is medically optimized and hemodynamically stable. Return precautions discussed, medication teach back done, and importance of physician followup. The patient verbalized understanding	. 67M pmhx of HTN, BPH, and ETOH use disorder (3-6 drinks / day for "whole life") here with likely viral vs toxin induced colitis and anemia with a hemolytic component. Heme/onc consulted -- recs followed. Direct tigist test was negative.  Patient to follow up outpatient with heme/onc. The patient's course was complicated by a melanotic stool. The patient was again orthostatic positive requiring total of 1L NS bolus. GI was consulted and patient underwent a EGD showing portal hypertensive gastropathy and some small varices as well w/o active bleeding. The patient tolerated diet and will follow up with Dr. Andrew on an outpatient basis. Also the patient will follow up with heme/onc as above and his PMD, Dr. Lopez.     On the day of discharge, the patient was seen and examined. Symptoms improved. Vital signs are stable. Labs and imaging reviewed. Patient is medically optimized and hemodynamically stable. Return precautions discussed, medication teach back done, and importance of physician followup. The patient verbalized understanding	.

## 2017-06-06 NOTE — DISCHARGE NOTE ADULT - PROVIDER TOKENS
TOKEN:'[id number]' TOKEN:'[id number]:'[id number]',FREE:[LAST:[YOUR PMD -- DR. CHAPA],PHONE:[(   )    -],FAX:[(   )    -]]

## 2017-06-06 NOTE — H&P ADULT - PROBLEM SELECTOR PLAN 3
Pt drinks 3-6 glasses of wine per day, but denies any w/d symptoms in the past.  He did not drink this week since he has been ill so last drink was Satruday night.  -Social work consult

## 2017-06-06 NOTE — CONSULT NOTE ADULT - PROBLEM SELECTOR RECOMMENDATION 3
etiology 2/2 to underlying liver disease, peripheral smear with lack of shistocytes- no evidence of DIC

## 2017-06-06 NOTE — PROGRESS NOTE ADULT - PROBLEM SELECTOR PLAN 2
Pt was found to be anemic to 9.8 with an unknown baseline and does not know if he was ever anemic before.  After 1L NS his hgb dropped to 8.1 possibly dilutional. Repeat this AM 8.4. Rectal exam x 2 negative for blood in rectal vault. Given history of ETOH abuse and cirrhosis seen on CT must consider GIB. Patient also had colonoscopy 2 weeks ago with 2 polypectomies. Possible malnourishment given ETOH use. Bilirubin elevated (mostly indirect), LDH high, Haptglobin low suggesting hemolytic process.   -Repeat CBC daily for now.   -No need for blood transfusion at this time  -Hgb goal >7  -Check Iron studies  -Check B12 and Folate and MMA  -Add on Myke Pt was found to be anemic to 9.8 with an unknown baseline and does not know if he was ever anemic before.  After 1L NS his hgb dropped to 8.1 possibly dilutional. Repeat this AM 8.4. Rectal exam x 2 negative for blood in rectal vault. Given history of ETOH abuse and cirrhosis seen on CT must consider GIB. Patient also had colonoscopy 2 weeks ago with 2 polypectomies. Possible malnourishment given ETOH use. Bilirubin elevated (mostly indirect), LDH high, Haptglobin low suggesting hemolytic process.   -Repeat CBC daily for now.   -No need for blood transfusion at this time  -Hgb goal >7  -Check Iron studies, B12, Folate at 10 pm.  -Check Myke and fibrinogen at 10 pm  -PPI IV BID for now for possible GIB.

## 2017-06-06 NOTE — CONSULT NOTE ADULT - SUBJECTIVE AND OBJECTIVE BOX
Hematology Oncology Consult Note (Dr Mercado)    The patient was seen and examined at bedside.     68 yo M with hx of HTN, BPH       ROS is otherwise negative.    PAST MEDICAL & SURGICAL HISTORY:  BPH (benign prostatic hypertrophy)  Hypertension  No significant past surgical history      Allergies:      Medications:  MEDICATIONS  (STANDING):  tamsulosin 0.4milliGRAM(s) Oral at bedtime  lactated ringers. 1000milliLiter(s) IV Continuous <Continuous>  pantoprazole  Injectable 40milliGRAM(s) IV Push every 12 hours      Social History:    FAMILY HISTORY:  No pertinent family history in first degree relatives      PHYSICAL EXAM:    T(F): 98.2, Max: 99.4 (06-05 @ 20:29)  HR: 76 (76 - 125)  BP: 156/83 (126/90 - 168/94)  RR: 17 (16 - 18)  SpO2: 97% (97% - 100%)  Wt(kg): --    Daily Height in cm: 182.88 (05 Jun 2017 20:29)    Daily     Gen: well developed, well nourished, comfortable  HEENT: normocephalic/atraumatic, no conjunctival pallor, no scleral icterus, no oral thrush/mucosal bleeding/mucositis  Neck: supple, no masses, no JVD  Back: nontender  Cardiovascular: RR, nl S1S2, no murmurs/rubs/gallops  Respiratory: clear air entry   Gastrointestinal: BS+, soft, NT/ND, no masses, no splenomegaly, no hepatomegaly, no evidence for ascites  Extremities: no clubbing/cyanosis, no edema, no calf tenderness, DP/PT 2+ b/l  Neurological: no focal deficits  Skin: no rash on visible skin  Lymph Nodes:  no cervical/supraclavicular LAD, no axillary/groin LAD                              8.3    10.2  )-----------( 113      ( 06 Jun 2017 05:54 )             25.4         CBC Full  -  ( 06 Jun 2017 05:54 )  WBC Count : 10.2 K/uL  Hemoglobin : 8.3 g/dL  Hematocrit : 25.4 %  Platelet Count - Automated : 113 K/uL  Mean Cell Volume : 99.2 fL  Mean Cell Hemoglobin : 32.4 pg  Mean Cell Hemoglobin Concentration : 32.7 g/dL  Auto Neutrophil # : x  Auto Lymphocyte # : x  Auto Monocyte # : x  Auto Eosinophil # : x  Auto Basophil # : x  Auto Neutrophil % : 68.0 %  Auto Lymphocyte % : 19.0 %  Auto Monocyte % : 7.0 %  Auto Eosinophil % : x  Auto Basophil % : 1.0 %        06-06    146<H>  |  111<H>  |  27<H>  ----------------------------<  100<H>  3.7   |  22  |  0.60    Ca    7.8<L>      06 Jun 2017 05:54  Mg     1.4     06-06    TPro  6.6  /  Alb  3.2<L>  /  TBili  2.6<H>  /  DBili  0.9<H>  /  AST  64<H>  /  ALT  29  /  AlkPhos  84  06-06        PT/INR - ( 06 Jun 2017 11:19 )   PT: 16.9 sec;   INR: 1.51          PTT - ( 06 Jun 2017 11:19 )  PTT:31.3 sec Hematology Oncology Consult Note (Dr Mercado)    The patient was seen and examined at bedside.     66 yo M with hx of HTN, BPH and heavy alcohol use admitted for symptoms of nausea, vomiting and diarrhea- began on sunday after he ate some fish saturday night. Reports 2 episodes of NBNB vomiting, also diarrhea that was dark brown. No abd pain, no bloating. Pt since then has had lack of appetite. Next day, pt's daughter brought him to ED. Denies any history of fevers, chills, night sweats or weight loss. Had a c-scope 2 weeks ago that found 2 polyps that were removed. No hx of bleeding. Pt reports drinking significant amount of alcohol on a daily basis.       ROS is otherwise negative.    PAST MEDICAL & SURGICAL HISTORY:  BPH (benign prostatic hypertrophy)  Hypertension  No significant past surgical history      Allergies: NKDA      Medications:  MEDICATIONS  (STANDING):  tamsulosin 0.4milliGRAM(s) Oral at bedtime  lactated ringers. 1000milliLiter(s) IV Continuous <Continuous>  pantoprazole  Injectable 40milliGRAM(s) IV Push every 12 hours      Social History: former smoker (50pk yr), heavy alcohol use    FAMILY HISTORY:  No pertinent family history in first degree relatives    PHYSICAL EXAM:    T(F): 98.2, Max: 99.4 (06-05 @ 20:29)  HR: 76 (76 - 125)  BP: 156/83 (126/90 - 168/94)  RR: 17 (16 - 18)  SpO2: 97% (97% - 100%)  Wt(kg): --    Daily Height in cm: 182.88 (05 Jun 2017 20:29)      Gen: well developed, well nourished, comfortable  HEENT: normocephalic/atraumatic, no conjunctival pallor, no scleral icterus, no oral thrush/mucosal bleeding/mucositis  Neck: supple, no masses, no JVD  Back: nontender  Cardiovascular: RR, nl S1S2, no murmurs/rubs/gallops  Respiratory: clear air entry   Gastrointestinal: BS+, soft, NT/ND, no masses, no splenomegaly, no hepatomegaly, no evidence for ascites  Extremities: no clubbing/cyanosis, no edema, no calf tenderness, DP/PT 2+ b/l  Neurological: no focal deficits  Skin: no rash on visible skin  Lymph Nodes:  no cervical/supraclavicular LAD, no axillary/groin LAD  rectal: guaiac +      Labs                        8.3    10.2  )-----------( 113      ( 06 Jun 2017 05:54 )             25.4         CBC Full  -  ( 06 Jun 2017 05:54 )  WBC Count : 10.2 K/uL  Hemoglobin : 8.3 g/dL  Hematocrit : 25.4 %  Platelet Count - Automated : 113 K/uL  Mean Cell Volume : 99.2 fL  Mean Cell Hemoglobin : 32.4 pg  Mean Cell Hemoglobin Concentration : 32.7 g/dL  Auto Neutrophil % : 68.0 %  Auto Lymphocyte % : 19.0 %  Auto Monocyte % : 7.0 %  Auto Eosinophil % : x  Auto Basophil % : 1.0 %        06-06    146<H>  |  111<H>  |  27<H>  ----------------------------<  100<H>  3.7   |  22  |  0.60    Ca    7.8<L>      06 Jun 2017 05:54  Mg     1.4     06-06    TPro  6.6  /  Alb  3.2<L>  /  TBili  2.6<H>  /  DBili  0.9<H>  /  AST  64<H>  /  ALT  29  /  AlkPhos  84  06-06        PT/INR - ( 06 Jun 2017 11:19 )   PT: 16.9 sec;   INR: 1.51          PTT - ( 06 Jun 2017 11:19 )  PTT:31.3 sec    Peripheral smear: no shistocytes seen, poikilocytosis, anisocytosis, + spherocytes Hematology Oncology Consult Note (Dr Mercado)    The patient was seen and examined at bedside.     66 yo M with hx of HTN, BPH and heavy alcohol use admitted for symptoms of nausea, vomiting and diarrhea- began on sunday after he ate some fish saturday night. Reports 2 episodes of NBNB vomiting, also diarrhea that was dark brown. No abd pain, no bloating. Pt since then has had lack of appetite. Next day, pt's daughter brought him to ED. Denies any history of fevers, chills, night sweats or weight loss. Had a c-scope 2 weeks ago that found 2 polyps that were removed. No hx of bleeding. Pt reports drinking significant amount of alcohol on a daily basis.       ROS is otherwise negative.    PAST MEDICAL & SURGICAL HISTORY:  BPH (benign prostatic hypertrophy)  Hypertension  No significant past surgical history      Allergies: NKDA      Medications:  MEDICATIONS  (STANDING):  tamsulosin 0.4milliGRAM(s) Oral at bedtime  lactated ringers. 1000milliLiter(s) IV Continuous <Continuous>  pantoprazole  Injectable 40milliGRAM(s) IV Push every 12 hours      Social History: former smoker (50pk yr), heavy alcohol use    FAMILY HISTORY:  No pertinent family history in first degree relatives  non-contributory    PHYSICAL EXAM:    T(F): 98.2, Max: 99.4 (06-05 @ 20:29)  HR: 76 (76 - 125)  BP: 156/83 (126/90 - 168/94)  RR: 17 (16 - 18)  SpO2: 97% (97% - 100%)  Wt(kg): --    Daily Height in cm: 182.88 (05 Jun 2017 20:29)      Gen: well developed, well nourished, comfortable  HEENT: normocephalic/atraumatic, no conjunctival pallor, no scleral icterus, no oral thrush/mucosal bleeding/mucositis  Neck: supple, no masses, no JVD  Back: nontender  Cardiovascular: RR, nl S1S2, no murmurs/rubs/gallops  Respiratory: clear air entry   Gastrointestinal: BS+, soft, NT/ND, no masses, no splenomegaly, no hepatomegaly, no evidence for ascites  Extremities: no clubbing/cyanosis, no edema, no calf tenderness, DP/PT 2+ b/l  Neurological: no focal deficits  Skin: no rash on visible skin  Lymph Nodes:  no cervical/supraclavicular LAD, no axillary/groin LAD  rectal: guaiac +      Labs                        8.3    10.2  )-----------( 113      ( 06 Jun 2017 05:54 )             25.4         CBC Full  -  ( 06 Jun 2017 05:54 )  WBC Count : 10.2 K/uL  Hemoglobin : 8.3 g/dL  Hematocrit : 25.4 %  Platelet Count - Automated : 113 K/uL  Mean Cell Volume : 99.2 fL  Mean Cell Hemoglobin : 32.4 pg  Mean Cell Hemoglobin Concentration : 32.7 g/dL  Auto Neutrophil % : 68.0 %  Auto Lymphocyte % : 19.0 %  Auto Monocyte % : 7.0 %  Auto Eosinophil % : x  Auto Basophil % : 1.0 %        06-06    146<H>  |  111<H>  |  27<H>  ----------------------------<  100<H>  3.7   |  22  |  0.60    Ca    7.8<L>      06 Jun 2017 05:54  Mg     1.4     06-06    TPro  6.6  /  Alb  3.2<L>  /  TBili  2.6<H>  /  DBili  0.9<H>  /  AST  64<H>  /  ALT  29  /  AlkPhos  84  06-06        PT/INR - ( 06 Jun 2017 11:19 )   PT: 16.9 sec;   INR: 1.51          PTT - ( 06 Jun 2017 11:19 )  PTT:31.3 sec    Peripheral smear: no shistocytes seen, poikilocytosis, anisocytosis, + spherocytes

## 2017-06-06 NOTE — CONSULT NOTE ADULT - SUBJECTIVE AND OBJECTIVE BOX
HPI:    67M pmhx heavy ETOH use and cirrhosis presents with nausea, vomiting and diarrhea x 1 day (Sunday).  He says that he went to dinner on Saturday night and had some "bad fish".  The next morning he woke up with nausea, vomiting and diarrhea x 7 during the day which he describes as very dark brown- not black/tarry or bloody.  He says that he was not able to eat/drink much that day.  The pt continued to feel unwell and his daughter came over and he was not feeling well from the day before so she brought him to the ED.  He denies any fevers, had subjective chills, no fatigue, chest pain, SOB or rectal bleeding of any kind.  He had a screening colonoscopy done 2 weeks ago by Dr. Galindo who said that he had 2 polyps (1 TA 1 hyperplastic polyp). GI consulted to evaluate new onset melena. Pt reportedly had 1 episode of black stool. Denies CP, SOB, n/v.       PAST MEDICAL & SURGICAL HISTORY:  BPH (benign prostatic hypertrophy)  Hypertension  No significant past surgical history      MEDICATIONS  (STANDING):  tamsulosin 0.4milliGRAM(s) Oral at bedtime  pantoprazole  Injectable 40milliGRAM(s) IV Push every 12 hours    MEDICATIONS  (PRN):      Allergies    No Known Allergies    Intolerances        SOCIAL HISTORY: +ETOH     FAMILY HISTORY:  No pertinent family history in first degree relatives      Vital Signs Last 24 Hrs  T(C): 36.8, Max: 37.4 (06-05 @ 20:29)  T(F): 98.3, Max: 99.4 (06-05 @ 20:29)  HR: 84 (76 - 125)  BP: 145/79 (126/90 - 168/94)  BP(mean): --  RR: 18 (16 - 18)  SpO2: 98% (97% - 100%)    PHYSICAL EXAM:    GEN: AOx3, NAD  HEENT: anicteric  CHEST: equal chest rise b/l  CVS: no m/r/g  ABD: soft, nt, nd bs+  RECTAL: scant black stool.       LABS:                        8.7    10.6  )-----------( 125      ( 06 Jun 2017 14:20 )             26.1     06-06    146<H>  |  111<H>  |  27<H>  ----------------------------<  100<H>  3.7   |  22  |  0.60    Ca    7.8<L>      06 Jun 2017 05:54  Mg     1.4     06-06    TPro  6.6  /  Alb  3.2<L>  /  TBili  2.6<H>  /  DBili  0.9<H>  /  AST  64<H>  /  ALT  29  /  AlkPhos  84  06-06    PT/INR - ( 06 Jun 2017 11:19 )   PT: 16.9 sec;   INR: 1.51          PTT - ( 06 Jun 2017 11:19 )  PTT:31.3 sec      RADIOLOGY & ADDITIONAL STUDIES:    IMPRESSION:  1. No extravasation of contrast to insinuate active bleeding.    2. Circumferential colonic wall thickening involving the cecum and   ascending colon suggestive for a focal colitis. Correlate clinically.    3. Circumferential wall thickening involving the rectum. Findings may   represent a focal proctitis, other etiologies including malignancy are   not excluded. Further clinical correlation is recommended.    4. Infiltration of the mesenteric fat with scattered subcentimeter   mesenteric lymph nodes most pronounced within the right lower quadrant.   Finding are nonspecific and could represent a generalized panniculitis.    5. Cirrhotic morphology with mild splenomegaly

## 2017-06-06 NOTE — PROGRESS NOTE ADULT - SUBJECTIVE AND OBJECTIVE BOX
PROGRESS NOTE    OVERNIGHT  TAMIE    SUBJECTIVE  The patient reports that he was feeling better yesterday but decided to come in. Today he denies any nausea, vomiting, diarrhea, abdominal pain or fever.  His last episode of vomiting and diarrhea was yesterday x 1 in the ED.     OBJECTIVE  PHYSICAL EXAM:  T(C): 36.8, Max: 37.4 (06-05 @ 20:29)  HR: 84 (84 - 125)  BP: 168/94 (126/90 - 168/94)  RR: 16 (16 - 18)  SpO2: 99% (99% - 100%)    Constitutional: NAD, comfortable, speaking clearly on exam answering questions appropriately   Eyes: No scleral icterus.   ENMT: Moist oral mucosa.   Respiratory: trace bibasilar crackles. Good air movements. No wheezes or rhonchi.   Cardiovascular: RRR no murmurs  Gastrointestinal: Soft NTND -- no rebound or guarding.  Rectal: Multiple external hemorrhoids No stool in vault, good rectal tone, brown stool smears.  Extremities: WWP with 1+ edema b/l (L>R) and chronic statis discoloration (L.R)  	  LABS:                        8.3    10.2  )-----------( 113      ( 06 Jun 2017 05:54 )             25.4     06-06    146<H>  |  111<H>  |  27<H>  ----------------------------<  100<H>  3.7   |  22  |  0.60    Ca    7.8<L>      06 Jun 2017 05:54  Mg     1.4     06-06    TPro  6.6  /  Alb  3.1<L>  /  TBili  2.9<H>  /  DBili  0.8<H>  /  AST  59<H>  /  ALT  32  /  AlkPhos  84  06-05    PT/INR - ( 05 Jun 2017 22:00 )   PT: 18.4 sec;   INR: 1.64          PTT - ( 05 Jun 2017 22:00 )  PTT:32.2 sec      RADIOLOGY & ADDITIONAL TESTS:  Reviewed by myself and with medical team.    MEDICATIONS  (STANDING):  tamsulosin 0.4milliGRAM(s) Oral at bedtime  lactated ringers. 1000milliLiter(s) IV Continuous <Continuous>  pantoprazole  Injectable 40milliGRAM(s) IV Push every 12 hours

## 2017-06-06 NOTE — H&P ADULT - PROBLEM SELECTOR PLAN 7
Holding home Amlodipine and Lisinopril in setting of orthostasis.  We also need to confirm dose with PCP as he was recent changed and told to take "half a pill" of one of his antihypertensives.

## 2017-06-06 NOTE — H&P ADULT - NSHPSOCIALHISTORY_GEN_ALL_CORE
Drinks 3-6 glasses of wine per day  Former smoker quit 6 years ago- 40pack year  Denies drug use  Lives alone  Daughter is HCP

## 2017-06-06 NOTE — PROGRESS NOTE ADULT - ASSESSMENT
67M pmhx of HTN, BPH, and ETOH use disorder (3-6 drinks / day for "whole life") here with likely viral vs toxin induced colitis and anemia.

## 2017-06-06 NOTE — H&P ADULT - NSHPLABSRESULTS_GEN_ALL_CORE
06-05    144  |  105  |  31<H>  ----------------------------<  129<H>  3.7   |  24  |  0.60    Ca    8.8      05 Jun 2017 22:00    TPro  6.6  /  Alb  3.1<L>  /  TBili  2.9<H>  /  DBili  x   /  AST  59<H>  /  ALT  32  /  AlkPhos  84  06-05                            8.1    10.1  )-----------( 101      ( 06 Jun 2017 00:19 )             24.3             LIVER FUNCTIONS - ( 05 Jun 2017 22:00 )  Alb: 3.1 g/dL / Pro: 6.6 g/dL / ALK PHOS: 84 U/L / ALT: 32 U/L / AST: 59 U/L / GGT: x             PT/INR - ( 05 Jun 2017 22:00 )   PT: 18.4 sec;   INR: 1.64          PTT - ( 05 Jun 2017 22:00 )  PTT:32.2 sec      CTA ab/pelvis 6/6:IMPRESSION:  1. No extravasation of contrast to insinuate active bleeding.    2. Circumferential colonic wall thickening involving the cecum and   ascending colon suggestive for a focal colitis. Correlate clinically.    3. Circumferential wall thickening involving the rectum. Findings may   represent a focal proctitis, other etiologies including malignancy are   not excluded. Further clinical correlation is recommended.    4. Infiltration of the mesenteric fat with scattered subcentimeter   mesenteric lymph nodes most pronounced within the right lower quadrant.   Finding are nonspecific and could represent a generalized panniculitis.    5. Cirrhotic morphology with mild splenomegaly

## 2017-06-06 NOTE — CONSULT NOTE ADULT - PROBLEM SELECTOR RECOMMENDATION 9
etiology unclear. MCV 99- borderline macrocytic  - iron studies etiology 2/2 to GI bleed versus hemolysis?  guaiac +,  MCV 99- borderline macrocytic  - iron studies inconsistent with iron def anemia  - elevated Iron sat (92%)- no hx of prior IV iron therapy or blood transfusions, r/o hemachromatosis with HFE gene testing.   pending b12 and folate  LDH elevated mildy, Haptoglobin mildly decreased (may all be secondary to underlying liver cirrhosis), Myke negative. check urine hemosidrin (to evaluate for intravascular hemolysis)  - transfuse for Hgb <7

## 2017-06-06 NOTE — H&P ADULT - HISTORY OF PRESENT ILLNESS
67M pmhx of HTN, BPH and heavy ETOH use presents with nausea, vomiting and diarrhea x 1 day (Sunday).  He says that he went to dinner on Saturday night and had some "bad fish".  The next morning he woke up with nausea, vomiting and diarrhea x 7 during the day which he describes as very dark brown- not black/tarry or bloody.  He says that he was not able to eat/drink much that day.  The next day (Monday) his daughter came over and he was not feeling well from the day before so she brought him to the ED.  He denies any fevers, had subjective chills, no fatigue, chest pain, SOB or rectal bleeding of any kind.  He had a screening colonoscopy done 2 weeks ago by Dr. Sheridan who said that he had 2 polyps which were removed- no path back yet.  He did not have any episodes of bleeding after that.  He has had no recent weight loss or gain.     In ED: T 99.4 , /90 RR18 O2 100% RA.   He was given Cipro 400mg Flagyl 500mg, NS 1L and 125mls/hand started on PPI drip r, Zofran 4mg IV x1

## 2017-06-06 NOTE — H&P ADULT - PROBLEM SELECTOR PLAN 1
Pt had 1 day of vomiting and diarrhea following a suspicious meal the night before which has not resolved.  He denies any bloody or black BMs.  He either had a viral gastroenteritis which is resolving or an infectious colitis from fish resulting in some sigmoid colon and rectal irritation and inflammation.    -No need to continue abx and he is not currently have further episodes.  -Continue hydration as he is orthostatic on exam.  NS 1L now and will reevaluate the need for another bolus after.  Meanwhile will continue NS @ 125mls/hr.   -Obtain collateral from Dr. Hairston in am, especially in regard to the polyp extraction to see if it is possible he had some residual bleeding after which may account for his anemia.

## 2017-06-06 NOTE — PROGRESS NOTE ADULT - PROBLEM SELECTOR PLAN 7
Holding home Amlodipine and Lisinopril in setting of orthostasis.  We also need to confirm dose with PCP as he was recent changed and told to take "half a pill" of one of his antihypertensives. Holding home Amlodipine and Lisinopril in setting of orthostasis.    -We also need to confirm dose with PCP as he was recent changed and told to take "half a pill" of one of his antihypertensives.

## 2017-06-06 NOTE — DISCHARGE NOTE ADULT - CARE PLAN
Principal Discharge DX:	Gastroenteritis  Goal:	Follow up with your primary care doctor as soon as possible.  Instructions for follow-up, activity and diet:	You likely have a viral gastroenteritis or possibly "food poisoning"   This does not require any antibiotics. Continue to drink plenty of water and get some rest.  Secondary Diagnosis:	Anemia  Instructions for follow-up, activity and diet:	You were found to have a low red blood cell count. This is likely multifactorial. This could be from your drinking habits which can cause vitamine deficiencies and also bone marrow suppression. Our labs shows that you also have some low grade break down of your red blood cells. Your blood level is NOT at a dangerous level and you can likely be discharged for an outpatient follow up with our heme/onc doctor  Secondary Diagnosis:	Cirrhosis  Secondary Diagnosis:	Thrombocytopenia  Secondary Diagnosis:	ETOH abuse Principal Discharge DX:	Gastroenteritis  Goal:	Follow up with your primary care doctor as soon as possible.  Instructions for follow-up, activity and diet:	You likely have a viral gastroenteritis or possibly "food poisoning"   This does not require any antibiotics. Continue to drink plenty of water and get some rest.  Secondary Diagnosis:	Anemia  Instructions for follow-up, activity and diet:	You were found to have a low red blood cell count. This is likely multifactorial. This could be from your drinking habits which can cause vitamine deficiencies and also bone marrow suppression. Our labs shows that you also have some low grade break down of your red blood cells. Your blood level is NOT at a dangerous level and you can likely be discharged for an outpatient follow up with our heme/onc doctor  Secondary Diagnosis:	Cirrhosis  Instructions for follow-up, activity and diet:	You have signs of scarred liver on the CAT scan. This is most likely from your alcohol use. You stop drinking alcohol until you have follow up imaging of your liver to make sure that the scarring does not progress. Of note, this is also affecting your red blood cells and your platelets which are both low. It is very important for you to stop drinking alcohol given the evidence of liver damage.  Secondary Diagnosis:	Thrombocytopenia  Instructions for follow-up, activity and diet:	Low platelets as above.  Secondary Diagnosis:	ETOH abuse  Instructions for follow-up, activity and diet:	as above. Principal Discharge DX:	Gastroenteritis  Goal:	Follow up with your primary care doctor as soon as possible.  Instructions for follow-up, activity and diet:	You likely have a viral gastroenteritis or possibly "food poisoning"   This does not require any antibiotics. Continue to drink plenty of water and get some rest.  Secondary Diagnosis:	Anemia  Instructions for follow-up, activity and diet:	You were found to have a low red blood cell count. This is likely multifactorial. This could be from your drinking habits which can cause vitamine deficiencies and also bone marrow suppression. Our labs shows that you also have some low grade break down of your red blood cells. Your blood level is NOT at a dangerous level and you can likely be discharged for an outpatient follow up with our heme/onc doctor  FOLLOW UP WITH DR. CLEMENTS NEXT WEDNESDAY - JUNE 14TH AT YOUR SCHEDULED APPOINTMENT.     ALSO, PLEASE FOLLOW UP WITH YOUR PRIMARY CARE DR. DR. CHAPA AS SOON AS POSSIBLE. PLEASE CALL TO MAKE AN APPOINTMENT.    ***  Secondary Diagnosis:	Cirrhosis  Instructions for follow-up, activity and diet:	You have signs of scarred liver on the CAT scan. This is most likely from your alcohol use. You stop drinking alcohol until you have follow up imaging of your liver to make sure that the scarring does not progress. Of note, this is also affecting your red blood cells and your platelets which are both low. It is very important for you to stop drinking alcohol given the evidence of liver damage.  Secondary Diagnosis:	Thrombocytopenia  Instructions for follow-up, activity and diet:	Low platelets as above.  Secondary Diagnosis:	ETOH abuse  Instructions for follow-up, activity and diet:	as above. Principal Discharge DX:	Gastroenteritis  Goal:	Follow up with your primary care doctor as soon as possible.  Instructions for follow-up, activity and diet:	You likely have a viral gastroenteritis or possibly "food poisoning"   This does not require any antibiotics. Continue to drink plenty of water and get some rest.  Secondary Diagnosis:	Anemia  Instructions for follow-up, activity and diet:	You were found to have a low red blood cell count. This is likely multifactorial. This could be from your drinking habits which can cause vitamine deficiencies and also bone marrow suppression. Our labs shows that you also have some low grade break down of your red blood cells. Your blood level is NOT at a dangerous level and you can likely be discharged for an outpatient follow up with our heme/onc doctor. Further you did have an endoscopy and they did show that you have small varices which are dilated blood vessels which are prone to bleed and some inflammation of your stomach. There was no active bleeding noted. It is important for you to STOP drinking all together as this is contributing to this. If you continue to drink at the same rate that you have been this will results in worsening of your liver disease and increase your risk of bleeding.     FOLLOW UP WITH DR. CLEMENTS NEXT WEDNESDAY - JUNE 14TH AT YOUR SCHEDULED APPOINTMENT.     ALSO, PLEASE FOLLOW UP WITH YOUR PRIMARY CARE DR. DR. CHAPA AS SOON AS POSSIBLE. PLEASE CALL TO MAKE AN APPOINTMENT.    ***  Secondary Diagnosis:	Cirrhosis  Instructions for follow-up, activity and diet:	You have signs of scarred liver on the CAT scan. This is most likely from your alcohol use. You stop drinking alcohol until you have follow up imaging of your liver to make sure that the scarring does not progress. Of note, this is also affecting your red blood cells and your platelets which are both low. It is very important for you to stop drinking alcohol given the evidence of liver damage.  Secondary Diagnosis:	Thrombocytopenia  Instructions for follow-up, activity and diet:	Low platelets as above.  Secondary Diagnosis:	ETOH abuse  Instructions for follow-up, activity and diet:	as above. Principal Discharge DX:	Gastroenteritis  Goal:	Follow up with your primary care doctor as soon as possible.  Instructions for follow-up, activity and diet:	You likely have a viral gastroenteritis or possibly "food poisoning"   This does not require any antibiotics. Continue to drink plenty of water and get some rest.  FOLLOW UP WITH DR. CHAPA AS SOON AS POSSIBLE (PLEASE MAKE AN APPOINTMENT).  Secondary Diagnosis:	Anemia  Instructions for follow-up, activity and diet:	You were found to have a low red blood cell count. This is likely multifactorial. This could be from your drinking habits which can cause vitamine deficiencies and also bone marrow suppression. Our labs shows that you also have some low grade break down of your red blood cells. Your blood level is NOT at a dangerous level and you can likely be discharged for an outpatient follow up with our heme/onc doctor. Further you did have an endoscopy and they did show that you have small varices which are dilated blood vessels which are prone to bleed and some inflammation of your stomach. There was no active bleeding noted. It is important for you to STOP drinking all together as this is contributing to this. If you continue to drink at the same rate that you have been this will results in worsening of your liver disease and increase your risk of bleeding.     FOLLOW UP WITH DR. CLEMENTS NEXT WEDNESDAY - JUNE 14TH AT YOUR SCHEDULED APPOINTMENT THE NUMBER IS PROVIDED.     ALSO, PLEASE MAKE AN APPOINTMENT WITH Dr. Andrew  AS SOON AS POSSIBLE. NUMBER PROIVDED IN THIS PAPERWORK AS WELL.     ***  Secondary Diagnosis:	Cirrhosis  Instructions for follow-up, activity and diet:	You have signs of scarred liver on the CAT scan. This is most likely from your alcohol use. You stop drinking alcohol until you have follow up imaging of your liver to make sure that the scarring does not progress. Of note, this is also affecting your red blood cells and your platelets which are both low. It is very important for you to stop drinking alcohol given the evidence of liver damage.  Secondary Diagnosis:	Thrombocytopenia  Instructions for follow-up, activity and diet:	Low platelets as above.  Secondary Diagnosis:	ETOH abuse  Instructions for follow-up, activity and diet:	as above. Principal Discharge DX:	Gastroenteritis  Goal:	Follow up with your primary care doctor as soon as possible.  Instructions for follow-up, activity and diet:	You likely have a viral gastroenteritis or possibly "food poisoning"   This does not require any antibiotics. Continue to drink plenty of water and get some rest.  FOLLOW UP WITH DR. CHAPA AS SOON AS POSSIBLE (PLEASE MAKE AN APPOINTMENT).  Secondary Diagnosis:	Anemia  Instructions for follow-up, activity and diet:	You were found to have a low red blood cell count. This is likely multifactorial. This could be from your drinking habits which can cause vitamine deficiencies and also bone marrow suppression. Our labs shows that you also have some low grade break down of your red blood cells. Your blood level is NOT at a dangerous level and you can likely be discharged for an outpatient follow up with our heme/onc doctor. Further you did have an endoscopy and they did show that you have small varices which are dilated blood vessels which are prone to bleed and some inflammation of your stomach. There was no active bleeding noted. It is important for you to STOP drinking all together as this is contributing to this. If you continue to drink at the same rate that you have been this will results in worsening of your liver disease and increase your risk of bleeding.     FOLLOW UP WITH DR. CLEMENTS NEXT WEDNESDAY - JUNE 14TH AT YOUR SCHEDULED APPOINTMENT THE NUMBER IS PROVIDED.     ALSO, PLEASE MAKE AN APPOINTMENT WITH Dr. Andrew  AS SOON AS POSSIBLE. NUMBER PROVIDED IN THIS PAPERWORK AS WELL.     ***  Secondary Diagnosis:	Cirrhosis  Instructions for follow-up, activity and diet:	You have signs of scarred liver on the CAT scan. This is most likely from your alcohol use. You stop drinking alcohol until you have follow up imaging of your liver to make sure that the scarring does not progress. Of note, this is also affecting your red blood cells and your platelets which are both low. It is very important for you to stop drinking alcohol given the evidence of liver damage.  Secondary Diagnosis:	Thrombocytopenia  Instructions for follow-up, activity and diet:	Low platelets as above.  Secondary Diagnosis:	ETOH abuse  Instructions for follow-up, activity and diet:	Stop drinking as above. Your liver can not tolerate it and you can have worsening of your symptoms. We have found your liver disease at an early stage which can be managed easier than late stage disease. Principal Discharge DX:	Acute blood loss anemia  Instructions for follow-up, activity and diet:	You were found to have a low red blood cell count. This is likely multifactorial. This could be from your drinking habits which can cause vitamine deficiencies and also bone marrow suppression. Our labs shows that you also have some low grade break down of your red blood cells. Your blood level is NOT at a dangerous level and you can likely be discharged for an outpatient follow up with our heme/onc doctor. Further you did have an endoscopy and they did show that you have small varices which are dilated blood vessels which are prone to bleed and some inflammation of your stomach. There was no active bleeding noted. It is important for you to STOP drinking all together as this is contributing to this. If you continue to drink at the same rate that you have been this will results in worsening of your liver disease and increase your risk of bleeding.     FOLLOW UP WITH DR. CLEMENTS NEXT WEDNESDAY - JUNE 14TH AT YOUR SCHEDULED APPOINTMENT THE NUMBER IS PROVIDED.     ALSO, PLEASE MAKE AN APPOINTMENT WITH Dr. Andrew  AS SOON AS POSSIBLE. NUMBER PROVIDED IN THIS PAPERWORK AS WELL.     ***  Secondary Diagnosis:	Cirrhosis  Goal:	refrain from drinking  Instructions for follow-up, activity and diet:	You have signs of scarred liver on the CAT scan. This is most likely from your alcohol use. You stop drinking alcohol until you have follow up imaging of your liver to make sure that the scarring does not progress. Of note, this is also affecting your red blood cells and your platelets which are both low. It is very important for you to stop drinking alcohol given the evidence of liver damage.  Secondary Diagnosis:	Thrombocytopenia  Instructions for follow-up, activity and diet:	you have low platelets secondary to cirrhosis  Secondary Diagnosis:	ETOH abuse  Instructions for follow-up, activity and diet:	Stop drinking as above. Your liver can not tolerate it and you can have worsening of your symptoms. We have found your liver disease at an early stage which can be managed easier than late stage disease.  Secondary Diagnosis:	Coagulopathy  Instructions for follow-up, activity and diet:	you have abnormal coagulopathy secondary to cirrhosis  Secondary Diagnosis:	Essential hypertension  Instructions for follow-up, activity and diet:	please restart your home medication.  Secondary Diagnosis:	Portal hypertensive gastropathy  Instructions for follow-up, activity and diet:	you have enlarged vessels on your upper endoscopy and this is likely the cause of your dark stool. please take pantoprazole as prescribed. Principal Discharge DX:	Acute blood loss anemia  Goal:	prevent reoccurence  Instructions for follow-up, activity and diet:	You were found to have a low red blood cell count. This is likely multifactorial. This could be from your drinking habits which can cause vitamine deficiencies and also bone marrow suppression. Our labs shows that you also have some low grade break down of your red blood cells. Your blood level is NOT at a dangerous level and you can likely be discharged for an outpatient follow up with our heme/onc doctor. Further you did have an endoscopy and they did show that you have small varices which are dilated blood vessels which are prone to bleed and some inflammation of your stomach. There was no active bleeding noted. It is important for you to STOP drinking all together as this is contributing to this. If you continue to drink at the same rate that you have been this will results in worsening of your liver disease and increase your risk of bleeding.     FOLLOW UP WITH DR. CLEMENTS NEXT WEDNESDAY - JUNE 14TH AT YOUR SCHEDULED APPOINTMENT THE NUMBER IS PROVIDED.     ALSO, PLEASE MAKE AN APPOINTMENT WITH Dr. Andrew  AS SOON AS POSSIBLE. NUMBER PROVIDED IN THIS PAPERWORK AS WELL.     ***  Secondary Diagnosis:	Cirrhosis  Goal:	refrain from drinking  Instructions for follow-up, activity and diet:	You have signs of scarred liver on the CAT scan. This is most likely from your alcohol use. You stop drinking alcohol until you have follow up imaging of your liver to make sure that the scarring does not progress. Of note, this is also affecting your red blood cells and your platelets which are both low. It is very important for you to stop drinking alcohol given the evidence of liver damage.  Secondary Diagnosis:	Thrombocytopenia  Instructions for follow-up, activity and diet:	you have low platelets secondary to cirrhosis  Secondary Diagnosis:	ETOH abuse  Instructions for follow-up, activity and diet:	Stop drinking as above. Your liver can not tolerate it and you can have worsening of your symptoms. We have found your liver disease at an early stage which can be managed easier than late stage disease.  Secondary Diagnosis:	Coagulopathy  Instructions for follow-up, activity and diet:	you have abnormal coagulopathy secondary to cirrhosis  Secondary Diagnosis:	Essential hypertension  Instructions for follow-up, activity and diet:	please restart your home medication.  Secondary Diagnosis:	Portal hypertensive gastropathy  Instructions for follow-up, activity and diet:	you have enlarged vessels on your upper endoscopy and this is likely the cause of your dark stool. please take pantoprazole as prescribed.

## 2017-06-06 NOTE — DISCHARGE NOTE ADULT - SECONDARY DIAGNOSIS.
Anemia Cirrhosis Thrombocytopenia ETOH abuse Essential hypertension Portal hypertensive gastropathy Coagulopathy

## 2017-06-06 NOTE — DISCHARGE NOTE ADULT - PATIENT PORTAL LINK FT
“You can access the [facility] Patient Portal, offered by [facility], by registering with the following website: [email or web address]”

## 2017-06-06 NOTE — PROGRESS NOTE ADULT - PROBLEM SELECTOR PLAN 4
Hyperbilirubinemia of 2.5 with unknown baseline. Will check LDH and haptoglobin especially in setting of anemia to determine if there is a component of hemolyisis.  -Repeat CMP and trend.  -Add on direct/indirect bilirubin LDH / hapto consistent w/ hemolysis.

## 2017-06-06 NOTE — H&P ADULT - PROBLEM SELECTOR PLAN 5
He has elevated coags in setting of acute illness and also long time heavy ETOH use with cirrhotic changes on CT so likely this is multifactorial.  He is not on any blood thinners.    -Monitor coags daily

## 2017-06-07 ENCOUNTER — RESULT REVIEW (OUTPATIENT)
Age: 67
End: 2017-06-07

## 2017-06-07 VITALS
HEART RATE: 88 BPM | DIASTOLIC BLOOD PRESSURE: 70 MMHG | SYSTOLIC BLOOD PRESSURE: 117 MMHG | OXYGEN SATURATION: 97 % | TEMPERATURE: 98 F | RESPIRATION RATE: 16 BRPM

## 2017-06-07 LAB
ALBUMIN SERPL ELPH-MCNC: 2.6 G/DL — LOW (ref 3.3–5)
ALP SERPL-CCNC: 70 U/L — SIGNIFICANT CHANGE UP (ref 40–120)
ALT FLD-CCNC: 27 U/L — SIGNIFICANT CHANGE UP (ref 10–45)
ANION GAP SERPL CALC-SCNC: 13 MMOL/L — SIGNIFICANT CHANGE UP (ref 5–17)
APTT BLD: 32.4 SEC — SIGNIFICANT CHANGE UP (ref 27.5–37.4)
AST SERPL-CCNC: 52 U/L — HIGH (ref 10–40)
BASOPHILS NFR BLD AUTO: 0.2 % — SIGNIFICANT CHANGE UP (ref 0–2)
BILIRUB DIRECT SERPL-MCNC: 0.8 MG/DL — HIGH (ref 0–0.2)
BILIRUB SERPL-MCNC: 2.3 MG/DL — HIGH (ref 0.2–1.2)
BUN SERPL-MCNC: 19 MG/DL — SIGNIFICANT CHANGE UP (ref 7–23)
CALCIUM SERPL-MCNC: 7.4 MG/DL — LOW (ref 8.4–10.5)
CHLORIDE SERPL-SCNC: 111 MMOL/L — HIGH (ref 96–108)
CO2 SERPL-SCNC: 21 MMOL/L — LOW (ref 22–31)
CREAT SERPL-MCNC: 0.6 MG/DL — SIGNIFICANT CHANGE UP (ref 0.5–1.3)
CRP SERPL-MCNC: 0.6 MG/DL — HIGH (ref 0–0.4)
D DIMER BLD IA.RAPID-MCNC: 791 NG/ML DDU — HIGH
EOSINOPHIL NFR BLD AUTO: 0.4 % — SIGNIFICANT CHANGE UP (ref 0–6)
ERYTHROCYTE [SEDIMENTATION RATE] IN BLOOD: 25 MM/HR — HIGH
FIBRINOGEN PPP-MCNC: 157 MG/DL — LOW (ref 258–438)
FOLATE SERPL-MCNC: 14 NG/ML — SIGNIFICANT CHANGE UP (ref 4.8–24.2)
GLUCOSE SERPL-MCNC: 96 MG/DL — SIGNIFICANT CHANGE UP (ref 70–99)
HAPTOGLOB SERPL-MCNC: 26 MG/DL — LOW (ref 34–200)
HAV IGM SER-ACNC: SIGNIFICANT CHANGE UP
HBV CORE IGM SER-ACNC: SIGNIFICANT CHANGE UP
HCT VFR BLD CALC: 22.8 % — LOW (ref 39–50)
HCT VFR BLD CALC: 23.7 % — LOW (ref 39–50)
HGB BLD-MCNC: 7.5 G/DL — LOW (ref 13–17)
HGB BLD-MCNC: 7.9 G/DL — LOW (ref 13–17)
INR BLD: 1.71 — HIGH (ref 0.88–1.16)
LYMPHOCYTES # BLD AUTO: 25.8 % — SIGNIFICANT CHANGE UP (ref 13–44)
MAGNESIUM SERPL-MCNC: 1.3 MG/DL — LOW (ref 1.6–2.6)
MCHC RBC-ENTMCNC: 32.3 PG — SIGNIFICANT CHANGE UP (ref 27–34)
MCHC RBC-ENTMCNC: 32.9 G/DL — SIGNIFICANT CHANGE UP (ref 32–36)
MCHC RBC-ENTMCNC: 33.3 G/DL — SIGNIFICANT CHANGE UP (ref 32–36)
MCHC RBC-ENTMCNC: 33.3 PG — SIGNIFICANT CHANGE UP (ref 27–34)
MCV RBC AUTO: 100 FL — SIGNIFICANT CHANGE UP (ref 80–100)
MCV RBC AUTO: 98.3 FL — SIGNIFICANT CHANGE UP (ref 80–100)
MONOCYTES NFR BLD AUTO: 9.3 % — SIGNIFICANT CHANGE UP (ref 2–14)
NEUTROPHILS NFR BLD AUTO: 64.3 % — SIGNIFICANT CHANGE UP (ref 43–77)
PLATELET # BLD AUTO: 111 K/UL — LOW (ref 150–400)
PLATELET # BLD AUTO: 98 K/UL — LOW (ref 150–400)
POTASSIUM SERPL-MCNC: 3.6 MMOL/L — SIGNIFICANT CHANGE UP (ref 3.5–5.3)
POTASSIUM SERPL-SCNC: 3.6 MMOL/L — SIGNIFICANT CHANGE UP (ref 3.5–5.3)
PROT SERPL-MCNC: 5.5 G/DL — LOW (ref 6–8.3)
PROTHROM AB SERPL-ACNC: 19.2 SEC — HIGH (ref 9.8–12.7)
RBC # BLD: 2.32 M/UL — LOW (ref 4.2–5.8)
RBC # BLD: 2.37 M/UL — LOW (ref 4.2–5.8)
RBC # FLD: 13.6 % — SIGNIFICANT CHANGE UP (ref 10.3–16.9)
RBC # FLD: 14.3 % — SIGNIFICANT CHANGE UP (ref 10.3–16.9)
RETICS/RBC NFR: 5.1 % — HIGH (ref 0.5–2.5)
SODIUM SERPL-SCNC: 145 MMOL/L — SIGNIFICANT CHANGE UP (ref 135–145)
VIT B12 SERPL-MCNC: 1396 PG/ML — HIGH (ref 243–894)
WBC # BLD: 11.9 K/UL — HIGH (ref 3.8–10.5)
WBC # BLD: 13.8 K/UL — HIGH (ref 3.8–10.5)
WBC # FLD AUTO: 11.9 K/UL — HIGH (ref 3.8–10.5)
WBC # FLD AUTO: 13.8 K/UL — HIGH (ref 3.8–10.5)

## 2017-06-07 PROCEDURE — 99239 HOSP IP/OBS DSCHRG MGMT >30: CPT

## 2017-06-07 RX ORDER — SODIUM CHLORIDE 9 MG/ML
500 INJECTION INTRAMUSCULAR; INTRAVENOUS; SUBCUTANEOUS ONCE
Qty: 0 | Refills: 0 | Status: COMPLETED | OUTPATIENT
Start: 2017-06-07 | End: 2017-06-07

## 2017-06-07 RX ORDER — PANTOPRAZOLE SODIUM 20 MG/1
1 TABLET, DELAYED RELEASE ORAL
Qty: 30 | Refills: 3 | OUTPATIENT
Start: 2017-06-07 | End: 2017-10-04

## 2017-06-07 RX ORDER — POTASSIUM CHLORIDE 20 MEQ
40 PACKET (EA) ORAL ONCE
Qty: 0 | Refills: 0 | Status: COMPLETED | OUTPATIENT
Start: 2017-06-07 | End: 2017-06-07

## 2017-06-07 RX ORDER — FOLIC ACID 0.8 MG
1 TABLET ORAL DAILY
Qty: 0 | Refills: 0 | Status: DISCONTINUED | OUTPATIENT
Start: 2017-06-07 | End: 2017-06-07

## 2017-06-07 RX ORDER — PANTOPRAZOLE SODIUM 20 MG/1
40 TABLET, DELAYED RELEASE ORAL
Qty: 0 | Refills: 0 | Status: DISCONTINUED | OUTPATIENT
Start: 2017-06-07 | End: 2017-06-07

## 2017-06-07 RX ORDER — MAGNESIUM SULFATE 500 MG/ML
4 VIAL (ML) INJECTION ONCE
Qty: 0 | Refills: 0 | Status: COMPLETED | OUTPATIENT
Start: 2017-06-07 | End: 2017-06-07

## 2017-06-07 RX ADMIN — PANTOPRAZOLE SODIUM 40 MILLIGRAM(S): 20 TABLET, DELAYED RELEASE ORAL at 11:07

## 2017-06-07 RX ADMIN — SODIUM CHLORIDE 1000 MILLILITER(S): 9 INJECTION INTRAMUSCULAR; INTRAVENOUS; SUBCUTANEOUS at 04:12

## 2017-06-07 RX ADMIN — Medication 100 GRAM(S): at 10:53

## 2017-06-07 RX ADMIN — Medication 1 MILLIGRAM(S): at 11:07

## 2017-06-07 RX ADMIN — SODIUM CHLORIDE 100 MILLILITER(S): 9 INJECTION INTRAMUSCULAR; INTRAVENOUS; SUBCUTANEOUS at 05:09

## 2017-06-07 RX ADMIN — SODIUM CHLORIDE 1000 MILLILITER(S): 9 INJECTION INTRAMUSCULAR; INTRAVENOUS; SUBCUTANEOUS at 05:10

## 2017-06-07 RX ADMIN — Medication 40 MILLIEQUIVALENT(S): at 11:06

## 2017-06-07 RX ADMIN — PANTOPRAZOLE SODIUM 40 MILLIGRAM(S): 20 TABLET, DELAYED RELEASE ORAL at 05:09

## 2017-06-08 LAB — SURGICAL PATHOLOGY STUDY: SIGNIFICANT CHANGE UP

## 2017-06-09 LAB — HEMOSIDERIN UR-MCNC: SIGNIFICANT CHANGE UP

## 2017-06-10 LAB
HOMOCYSTEINE LEVEL: 5.1 UMOL/L — SIGNIFICANT CHANGE UP
METHYLMALONIC ACID LEVEL: 120 NMOL/L — SIGNIFICANT CHANGE UP (ref 87–318)

## 2017-06-11 DIAGNOSIS — D69.6 THROMBOCYTOPENIA, UNSPECIFIED: ICD-10-CM

## 2017-06-11 DIAGNOSIS — I10 ESSENTIAL (PRIMARY) HYPERTENSION: ICD-10-CM

## 2017-06-11 DIAGNOSIS — D62 ACUTE POSTHEMORRHAGIC ANEMIA: ICD-10-CM

## 2017-06-11 DIAGNOSIS — E80.6 OTHER DISORDERS OF BILIRUBIN METABOLISM: ICD-10-CM

## 2017-06-11 DIAGNOSIS — N40.0 BENIGN PROSTATIC HYPERPLASIA WITHOUT LOWER URINARY TRACT SYMPTOMS: ICD-10-CM

## 2017-06-11 DIAGNOSIS — K92.2 GASTROINTESTINAL HEMORRHAGE, UNSPECIFIED: ICD-10-CM

## 2017-06-11 DIAGNOSIS — Z87.891 PERSONAL HISTORY OF NICOTINE DEPENDENCE: ICD-10-CM

## 2017-06-11 DIAGNOSIS — K76.6 PORTAL HYPERTENSION: ICD-10-CM

## 2017-06-11 DIAGNOSIS — K31.89 OTHER DISEASES OF STOMACH AND DUODENUM: ICD-10-CM

## 2017-06-11 DIAGNOSIS — K92.1 MELENA: ICD-10-CM

## 2017-06-11 DIAGNOSIS — K64.4 RESIDUAL HEMORRHOIDAL SKIN TAGS: ICD-10-CM

## 2017-06-11 DIAGNOSIS — K52.9 NONINFECTIVE GASTROENTERITIS AND COLITIS, UNSPECIFIED: ICD-10-CM

## 2017-06-11 DIAGNOSIS — F10.10 ALCOHOL ABUSE, UNCOMPLICATED: ICD-10-CM

## 2017-06-11 DIAGNOSIS — E87.2 ACIDOSIS: ICD-10-CM

## 2017-06-11 DIAGNOSIS — I85.00 ESOPHAGEAL VARICES WITHOUT BLEEDING: ICD-10-CM

## 2017-06-11 DIAGNOSIS — D69.59 OTHER SECONDARY THROMBOCYTOPENIA: ICD-10-CM

## 2017-06-11 DIAGNOSIS — K70.30 ALCOHOLIC CIRRHOSIS OF LIVER WITHOUT ASCITES: ICD-10-CM

## 2017-06-11 DIAGNOSIS — K74.60 UNSPECIFIED CIRRHOSIS OF LIVER: ICD-10-CM

## 2017-06-20 ENCOUNTER — RX RENEWAL (OUTPATIENT)
Age: 67
End: 2017-06-20

## 2017-07-28 ENCOUNTER — INPATIENT (INPATIENT)
Facility: HOSPITAL | Age: 67
LOS: 0 days | Discharge: HOME | DRG: 433 | End: 2017-07-29
Attending: INTERNAL MEDICINE | Admitting: INTERNAL MEDICINE
Payer: COMMERCIAL

## 2017-07-28 VITALS
WEIGHT: 199.96 LBS | RESPIRATION RATE: 18 BRPM | SYSTOLIC BLOOD PRESSURE: 138 MMHG | DIASTOLIC BLOOD PRESSURE: 78 MMHG | TEMPERATURE: 99 F | OXYGEN SATURATION: 99 % | HEART RATE: 90 BPM | HEIGHT: 74 IN

## 2017-07-28 DIAGNOSIS — R00.0 TACHYCARDIA, UNSPECIFIED: ICD-10-CM

## 2017-07-28 DIAGNOSIS — Z29.9 ENCOUNTER FOR PROPHYLACTIC MEASURES, UNSPECIFIED: ICD-10-CM

## 2017-07-28 DIAGNOSIS — F10.10 ALCOHOL ABUSE, UNCOMPLICATED: ICD-10-CM

## 2017-07-28 DIAGNOSIS — N40.0 BENIGN PROSTATIC HYPERPLASIA WITHOUT LOWER URINARY TRACT SYMPTOMS: ICD-10-CM

## 2017-07-28 DIAGNOSIS — K70.31 ALCOHOLIC CIRRHOSIS OF LIVER WITH ASCITES: ICD-10-CM

## 2017-07-28 DIAGNOSIS — R63.8 OTHER SYMPTOMS AND SIGNS CONCERNING FOOD AND FLUID INTAKE: ICD-10-CM

## 2017-07-28 DIAGNOSIS — I10 ESSENTIAL (PRIMARY) HYPERTENSION: ICD-10-CM

## 2017-07-28 DIAGNOSIS — K65.2 SPONTANEOUS BACTERIAL PERITONITIS: ICD-10-CM

## 2017-07-28 LAB
ALBUMIN SERPL ELPH-MCNC: 2.7 G/DL — LOW (ref 3.3–5)
ALP SERPL-CCNC: 100 U/L — SIGNIFICANT CHANGE UP (ref 40–120)
ALT FLD-CCNC: 29 U/L — SIGNIFICANT CHANGE UP (ref 10–45)
AMMONIA BLD-MCNC: 13 UMOL/L — SIGNIFICANT CHANGE UP (ref 11–55)
AMYLASE FLD-CCNC: 12 U/L — SIGNIFICANT CHANGE UP
ANION GAP SERPL CALC-SCNC: 10 MMOL/L — SIGNIFICANT CHANGE UP (ref 5–17)
APPEARANCE UR: CLEAR — SIGNIFICANT CHANGE UP
APTT BLD: 41.5 SEC — HIGH (ref 27.5–37.4)
AST SERPL-CCNC: 64 U/L — HIGH (ref 10–40)
B PERT IGG+IGM PNL SER: CLEAR — SIGNIFICANT CHANGE UP
BASOPHILS NFR BLD AUTO: 0.6 % — SIGNIFICANT CHANGE UP (ref 0–2)
BILIRUB SERPL-MCNC: 1.5 MG/DL — HIGH (ref 0.2–1.2)
BILIRUB UR-MCNC: (no result)
BUN SERPL-MCNC: 13 MG/DL — SIGNIFICANT CHANGE UP (ref 7–23)
CALCIUM SERPL-MCNC: 8.2 MG/DL — LOW (ref 8.4–10.5)
CHLORIDE SERPL-SCNC: 104 MMOL/L — SIGNIFICANT CHANGE UP (ref 96–108)
CO2 SERPL-SCNC: 23 MMOL/L — SIGNIFICANT CHANGE UP (ref 22–31)
COLOR FLD: YELLOW — SIGNIFICANT CHANGE UP
COLOR SPEC: YELLOW — SIGNIFICANT CHANGE UP
CREAT SERPL-MCNC: 0.7 MG/DL — SIGNIFICANT CHANGE UP (ref 0.5–1.3)
DIFF PNL FLD: NEGATIVE — SIGNIFICANT CHANGE UP
EOSINOPHIL NFR BLD AUTO: 1.4 % — SIGNIFICANT CHANGE UP (ref 0–6)
ETHANOL SERPL-MCNC: <10 MG/DL — SIGNIFICANT CHANGE UP (ref 0–10)
FLUID INTAKE SUBSTANCE CLASS: SIGNIFICANT CHANGE UP
FLUID SEGMENTED GRANULOCYTES: 1 % — SIGNIFICANT CHANGE UP
GGT SERPL-CCNC: 91 U/L — HIGH (ref 9–50)
GLUCOSE FLD-MCNC: 95 MG/DL — SIGNIFICANT CHANGE UP
GLUCOSE SERPL-MCNC: 92 MG/DL — SIGNIFICANT CHANGE UP (ref 70–99)
GLUCOSE UR QL: NEGATIVE — SIGNIFICANT CHANGE UP
HCT VFR BLD CALC: 28.2 % — LOW (ref 39–50)
HGB BLD-MCNC: 8.4 G/DL — LOW (ref 13–17)
INR BLD: 1.68 — HIGH (ref 0.88–1.16)
KETONES UR-MCNC: NEGATIVE — SIGNIFICANT CHANGE UP
LACTATE SERPL-SCNC: 1.3 MMOL/L — SIGNIFICANT CHANGE UP (ref 0.5–2)
LDH SERPL L TO P-CCNC: 88 U/L — SIGNIFICANT CHANGE UP
LEUKOCYTE ESTERASE UR-ACNC: NEGATIVE — SIGNIFICANT CHANGE UP
LIDOCAIN IGE QN: 47 U/L — SIGNIFICANT CHANGE UP (ref 7–60)
LYMPHOCYTES # BLD AUTO: 28.5 % — SIGNIFICANT CHANGE UP (ref 13–44)
LYMPHOCYTES # FLD: 23 % — SIGNIFICANT CHANGE UP
MCHC RBC-ENTMCNC: 23.3 PG — LOW (ref 27–34)
MCHC RBC-ENTMCNC: 29.8 G/DL — LOW (ref 32–36)
MCV RBC AUTO: 78.3 FL — LOW (ref 80–100)
MESOTHL CELL # FLD: 3 % — SIGNIFICANT CHANGE UP
MONOCYTES NFR BLD AUTO: 15.4 % — HIGH (ref 2–14)
MONOS+MACROS # FLD: 73 % — SIGNIFICANT CHANGE UP
NEUTROPHILS NFR BLD AUTO: 54.1 % — SIGNIFICANT CHANGE UP (ref 43–77)
NITRITE UR-MCNC: NEGATIVE — SIGNIFICANT CHANGE UP
PH UR: 5.5 — SIGNIFICANT CHANGE UP (ref 5–8)
PLATELET # BLD AUTO: 121 K/UL — LOW (ref 150–400)
POTASSIUM SERPL-MCNC: 3.8 MMOL/L — SIGNIFICANT CHANGE UP (ref 3.5–5.3)
POTASSIUM SERPL-SCNC: 3.8 MMOL/L — SIGNIFICANT CHANGE UP (ref 3.5–5.3)
PROT FLD-MCNC: 1.5 G/DL — SIGNIFICANT CHANGE UP
PROT SERPL-MCNC: 7.2 G/DL — SIGNIFICANT CHANGE UP (ref 6–8.3)
PROT UR-MCNC: NEGATIVE MG/DL — SIGNIFICANT CHANGE UP
PROTHROM AB SERPL-ACNC: 18.9 SEC — HIGH (ref 9.8–12.7)
RBC # BLD: 3.6 M/UL — LOW (ref 4.2–5.8)
RBC # FLD: 21.2 % — HIGH (ref 10.3–16.9)
RCV VOL RI: 3000 /UL — HIGH (ref 0–5)
SODIUM SERPL-SCNC: 137 MMOL/L — SIGNIFICANT CHANGE UP (ref 135–145)
SP GR SPEC: 1.01 — SIGNIFICANT CHANGE UP (ref 1–1.03)
TOTAL NUCLEATED CELL COUNT, BODY FLUID: 420 /UL — HIGH (ref 0–5)
TUBE TYPE: SIGNIFICANT CHANGE UP
UROBILINOGEN FLD QL: 1 E.U./DL — SIGNIFICANT CHANGE UP
WBC # BLD: 4.8 K/UL — SIGNIFICANT CHANGE UP (ref 3.8–10.5)
WBC # FLD AUTO: 4.8 K/UL — SIGNIFICANT CHANGE UP (ref 3.8–10.5)

## 2017-07-28 PROCEDURE — 49082 ABD PARACENTESIS: CPT

## 2017-07-28 PROCEDURE — 99222 1ST HOSP IP/OBS MODERATE 55: CPT | Mod: GC

## 2017-07-28 PROCEDURE — 71010: CPT | Mod: 26

## 2017-07-28 PROCEDURE — 99285 EMERGENCY DEPT VISIT HI MDM: CPT | Mod: 25,GC

## 2017-07-28 PROCEDURE — 93970 EXTREMITY STUDY: CPT | Mod: 26

## 2017-07-28 PROCEDURE — 76700 US EXAM ABDOM COMPLETE: CPT | Mod: 26

## 2017-07-28 RX ORDER — ACETAMINOPHEN 500 MG
650 TABLET ORAL ONCE
Qty: 0 | Refills: 0 | Status: COMPLETED | OUTPATIENT
Start: 2017-07-28 | End: 2017-07-28

## 2017-07-28 RX ORDER — ACETAMINOPHEN 500 MG
650 TABLET ORAL EVERY 6 HOURS
Qty: 0 | Refills: 0 | Status: DISCONTINUED | OUTPATIENT
Start: 2017-07-28 | End: 2017-07-29

## 2017-07-28 RX ORDER — CEFTRIAXONE 500 MG/1
2 INJECTION, POWDER, FOR SOLUTION INTRAMUSCULAR; INTRAVENOUS EVERY 24 HOURS
Qty: 0 | Refills: 0 | Status: DISCONTINUED | OUTPATIENT
Start: 2017-07-29 | End: 2017-07-29

## 2017-07-28 RX ORDER — TAMSULOSIN HYDROCHLORIDE 0.4 MG/1
0.4 CAPSULE ORAL AT BEDTIME
Qty: 0 | Refills: 0 | Status: DISCONTINUED | OUTPATIENT
Start: 2017-07-28 | End: 2017-07-29

## 2017-07-28 RX ORDER — HEPARIN SODIUM 5000 [USP'U]/ML
5000 INJECTION INTRAVENOUS; SUBCUTANEOUS EVERY 8 HOURS
Qty: 0 | Refills: 0 | Status: DISCONTINUED | OUTPATIENT
Start: 2017-07-28 | End: 2017-07-29

## 2017-07-28 RX ORDER — PANTOPRAZOLE SODIUM 20 MG/1
40 TABLET, DELAYED RELEASE ORAL
Qty: 0 | Refills: 0 | Status: DISCONTINUED | OUTPATIENT
Start: 2017-07-28 | End: 2017-07-29

## 2017-07-28 RX ORDER — CEFTRIAXONE 500 MG/1
2 INJECTION, POWDER, FOR SOLUTION INTRAMUSCULAR; INTRAVENOUS ONCE
Qty: 0 | Refills: 0 | Status: COMPLETED | OUTPATIENT
Start: 2017-07-28 | End: 2017-07-28

## 2017-07-28 RX ADMIN — CEFTRIAXONE 100 GRAM(S): 500 INJECTION, POWDER, FOR SOLUTION INTRAMUSCULAR; INTRAVENOUS at 21:04

## 2017-07-28 RX ADMIN — Medication 650 MILLIGRAM(S): at 21:08

## 2017-07-28 RX ADMIN — Medication 1 MILLIGRAM(S): at 21:33

## 2017-07-28 NOTE — ED PROCEDURE NOTE - CPROC ED PARACENTESIS DETAIL1
The anatomic location was identified, and a needle with catheter/plastic sheath was introduced into the peritoneal cavity. Fluid was allowed to drain.

## 2017-07-28 NOTE — INPATIENT CERTIFICATION FOR MEDICARE PATIENTS - RISKS OF ADVERSE EVENTS
Concern for cardiopulmonary deterioration/Concern for worsening infectious process/Concern for neurologic deterioration

## 2017-07-28 NOTE — H&P ADULT - PROBLEM SELECTOR PLAN 1
Abdominal distention, pain, fevers, chills, in the setting of alcohol cirrhosis likely 2/2 spontaneous bacterial peritonitis. 1/4 SIRS without lactate on admission. s/p diagnostic paracentesis in ED which revealed > 420 nucleated cells. s/p Ceftriaxone 2g IVPB x1 in ED.   - c/w Ceftriaxone 2g daily for tx of SBP  - CBC w/ differential   - Tylenol PRN for temp > 100.4

## 2017-07-28 NOTE — H&P ADULT - ASSESSMENT
66 y/o M w/ pmhx of HTN, BPH, Etoh abuse, recent diagnosis of alcoholic cirrhosis presents to ED complaining of abdominal pain, fevers and chills, being admitted for SBP

## 2017-07-28 NOTE — ED ADULT NURSE NOTE - OBJECTIVE STATEMENT
67y M, A&ox3, came into Er sent by GI MD for concern for retaining fluid and infection in the stomach as per pt. PT states had food poisoning 2months ago and since then haven't been feeling well. PT c/o mild nausea no vomiting, no diarrhea, no constipation. Denies urinary s/s. +bowel sounds, last bm yesterday. Noted distended and round abdomen, firm. States past week increased in size. Denies pain. No cp, no sob. Will continue to monitor.

## 2017-07-28 NOTE — H&P ADULT - HISTORY OF PRESENT ILLNESS
68 y/o M w/ pmhx of HTN, BPH, Etoh abuse, recent diagnosis of alcoholic cirrhosis presents to ED complaining of abdominal pain, fevers and chills at home. Patient was recently admitted last month for melanotic stools, underwent EGD which revealed esophageal varices portal hypertension discharged on PPI with GI followup. Patient states over the past week he has noticed increased abdominal distention, diffuse tenderness, most severe at the epigastric area and RLQ. Fevers and chills at home, stating his highest temperature was 100.9. Patient states he has not had a drink since before his last admission > 1 month ago. Patient denies any dizziness, headaches, weight changes, SOB, chest pain, N/V, diarrhea, constipation, dysuria, bloody or melanotic stools.     In the ED, vital signs temp: 99.4, BP: 138/78, HR: 90, RR:18, O2: 99% on room air. Labs significant for AST > ALT ratio, elevated t. bili and INR. Abdominal sono suggestive of cirrhosis portal HTN, splenomegaly. Diagnostic paracentesis reveled > 420 nucleated cells, suggestive of SBP. Patient given Ceftriaxone 2g IVPB x 1. Of note, despite patient stating he has not had a etoh beverage x 1 months, patient has tremors and tongue fasciculation's on exam. Ativan 1mg x1 given.

## 2017-07-28 NOTE — ED PROVIDER NOTE - MEDICAL DECISION MAKING DETAILS
66 yo M with Hx of alcoholic cirrhosis, recent GI bleed, Hypertension, and BPH presents with increasing abdominal distension for a month, abdominal pain, fevers (measured at home to 100.9) and chills. Paracentesis was performed in the ED and showed > 250 nucleated cells.

## 2017-07-28 NOTE — H&P ADULT - NSHPPHYSICALEXAM_GEN_ALL_CORE
.  VITAL SIGNS:  T(C): 36.8 (07-28-17 @ 21:19), Max: 37.9 (07-28-17 @ 19:43)  T(F): 98.3 (07-28-17 @ 21:19), Max: 100.3 (07-28-17 @ 19:43)  HR: 110 (07-28-17 @ 21:19) (90 - 110)  BP: 160/97 (07-28-17 @ 21:19) (138/78 - 160/97)  BP(mean): --  RR: 18 (07-28-17 @ 21:19) (16 - 18)  SpO2: 97% (07-28-17 @ 21:19) (97% - 99%)  Wt(kg): --    PHYSICAL EXAM:    Constitutional: diaphoretic, NAD, tremors vs chills, otherwise NAD   Head: NC/AT  Eyes: PERRL, EOMI, clear conjunctiva, no scleral icterus   ENT: Dry mucous membranes, + tongue fasciculations   Neck: supple; no JVD   Respiratory: bibasilar fine crackles, no tachypnea, no use of accessory muscles   Cardiac: +S1/S2; RRR; + 2/6 systolic murmur RUSB without carotid radiation   Gastrointestinal: +  abdominal distention with fluid wave. + RUQ, epigastric tenderness. +hepatomegaly; +BSx4  Extremities: 2+ peripheral edema to knees   Musculoskeletal: NROM x4; no joint swelling, tenderness or erythema  Vascular: 2+ radial, femoral, DP/PT pulses B/L  Neurologic: AAOx3; CNII-XII grossly intact; no focal deficits  CIWA- 4

## 2017-07-28 NOTE — H&P ADULT - NSHPSOCIALHISTORY_GEN_ALL_CORE
Denies etoh use x 1 month Denies etoh use x 1 month  former smoker , quit 6 years ago  lives alone, wife recently passed away

## 2017-07-28 NOTE — ED ADULT TRIAGE NOTE - CHIEF COMPLAINT QUOTE
"I have stomach pain. I had food poisoning 2 month ago, and since then, I haven't felt well. I went to see my doctor, MD Melendez, today, and she thinks I have fluid and infection. So she sent me here. "

## 2017-07-28 NOTE — H&P ADULT - ATTENDING COMMENTS
pt seen and examined; reviewed vs, labs, cxr, EKG   pt a/f sepsis in setting of likely SBP. pt stated that last drink was >45 days ago    PE findings as above except pt in NAD, not diaphoretic at time of exam, tongue appears moist, no JVD but +HJR noted,  lungs CTA b/l, abdomen slightly distended but nontender; agree that patient at tongue fasciculations w/ mild hand tremors, no asterixis noted b/l; +lower ext edema as noted above.     a/p:   1. SBP: on ceftriaxone 2 grams / day, follow up ctxs  2. ETOH cirrhosis: follow up GI recs; c/w PPI   3. hx of ETOH abuse; pt denies recent ETOH use; monitor CIWA; cautious use of BZDs prn in setting of cirrhosis  4. HTN: holding bp medications currently, restart if BP remains elevated pt seen and examined; reviewed vs, labs, cxr, EKG   pt a/f sepsis in setting of likely SBP. pt stated that last drink was >45 days ago    PE findings as above except pt in NAD, not diaphoretic at time of exam, tongue appears moist, no JVD but +HJR noted,  lungs CTA b/l, abdomen slightly distended but nontender; agree that patient at tongue fasciculations w/ mild hand tremors, no asterixis noted b/l; +lower ext edema as noted above.     a/p:   1. SBP: on ceftriaxone 2 grams / day, follow up ctxs  2. ETOH cirrhosis: follow up GI recs; c/w PPI   3. hx of ETOH abuse; pt denies recent ETOH use; monitor CIWA; cautious use of BZDs prn in setting of cirrhosis  4. HTN: holding bp medications currently, restart if BP remains elevated  5. anemia: stable, monitor CBC; recheck iron studies, LDH, haptoglobin

## 2017-07-28 NOTE — H&P ADULT - PROBLEM SELECTOR PLAN 4
Holding home enalapril 20 and Norvasc 5 in setting of SBP HR . EKG: sinus tachycardia @ 100bpm without ischemic changes. Likely 2/2 dehydration in setting of decreased PO intake, sepsis. Unlikely PE, as patient without SOB. Wells score 1.5, low risk.   - will continue to monitor HD  - consider repeat EKG if tachycardia not resolving  - consider echocardiogram with PE findings of RUSB systolic murmur.

## 2017-07-28 NOTE — ED PROVIDER NOTE - CARE PLAN
Principal Discharge DX:	Spontaneous bacterial peritonitis  Secondary Diagnosis:	Alcoholic cirrhosis of liver with ascites

## 2017-07-28 NOTE — H&P ADULT - PROBLEM SELECTOR PLAN 2
Recent admission with melena, orthostatic hypotension. s/p EGD last admission revealing esophageal varices, portal HTN, discharged with diagnosis of alcoholic cirrhosis. Patient states he's been sober x 1 month. Patient followed by Dr. Andrew (GI). Abdominal sono revealing cirrhosis, portal HTN, splenomegaly, ascites.   - Will consult GI, Dr. Andrew in AM for further management of worsening alcoholic cirrhosis  - Patient clinically overloaded with lower extremity edema, bibasilar crackles. Holding diuresis in setting of SBP, and high risk of hepatorenal syndrome   - f/u GGT, differential to r/o etoh hepatitis. Recent admission with melena, orthostatic hypotension. s/p EGD last admission revealing esophageal varices, portal HTN, discharged with diagnosis of alcoholic cirrhosis. Patient states he's been sober x 1 month. Patient followed by Dr. Andrew (GI). Abdominal sono revealing cirrhosis, portal HTN, splenomegaly, ascites. Elevated INR, t.bili, AST > ALT.   - Will consult GI, Dr. Andrew in AM for further management of worsening alcoholic cirrhosis  - MELD 14, which gives patient 6% 3 month mortality risk  - Patient clinically overloaded with lower extremity edema, bibasilar crackles. Holding diuresis in setting of SBP, and high risk of hepatorenal syndrome   - f/u GGT, differential to r/o etoh hepatitis.

## 2017-07-28 NOTE — ED PROVIDER NOTE - OBJECTIVE STATEMENT
68 yo M with Hx of alcoholic cirrhosis, recent GI bleed, Hypertension, and BPH who presents with abdominal pain and distension 66 yo M with Hx of alcoholic cirrhosis, recent GI bleed, Hypertension, and BPH who presents with abdominal pain and distension. Patient reports slowly progressing abdominal distension for 2-3 weeks. He states that for several days he has been having diffuse, crampy abdominal pain. He also reports several days of fever and chills at home the highest measured at 100.7F last night. He denies nausea/vomiting, diarrhea, constipation, hematemesis and hematochezia/melena. Of note, he was recently admitted last month for melena likely 2/2 variceal bleeding although no bleeding was noted on endoscopy.

## 2017-07-28 NOTE — ED ADULT TRIAGE NOTE - ARRIVAL INFO ADDITIONAL COMMENTS
Pt presented to ED with diffused abdominal pain, nausea and abdominal distention. Pt denies vomiting, dysuria, chest pain, SOB, dizziness. Hx of HTN. Last BM was yesterday and it was "small amount."

## 2017-07-28 NOTE — H&P ADULT - PROBLEM SELECTOR PLAN 7
no IVF  monitor/ replete lytes  DASH diet    Dispo: [facility]    FULL CODE HSQ, SCD's, ambulate as tolerated

## 2017-07-28 NOTE — H&P ADULT - PROBLEM SELECTOR PLAN 3
Although patient denies etoh use in > x 1 month, patient with tremors and tongue fasciculations in ED. s/p Ativan 1mg x 1 dose.  - Ativan 1mg q4h PRN for CIWA > 4-6  - CIWA q4h Although patient denies etoh use in > x 1 month, patient with tremors and tongue fasciculations in ED. s/p Ativan 1mg x 1 dose.  - Ativan 1mg q4h PRN for CIWA > 8  - CIWA q4h

## 2017-07-28 NOTE — ED PROVIDER NOTE - ATTENDING CONTRIBUTION TO CARE
66yo M hx of alcoholic cirrhosis, here with fevers since this am, and diffuse mild abdominal pain. minimal tenderness on exam. Tap shows >400 nucleated cells, consistent w/ SBP. Plan for 2g ceftriaxone and admission. Medicine to touch base w/ his GI tomorrow am.

## 2017-07-28 NOTE — ED PROVIDER NOTE - PHYSICAL EXAMINATION
CONSTITUTIONAL: Well-appearing; well-nourished; in no apparent distress.   HEAD: Normocephalic; atraumatic.   EYES:  conjunctiva and sclera clear  ENT: normal nose; no rhinorrhea; normal pharynx with no erythema or lesions.   NECK: Supple; non-tender;   CARDIOVASCULAR: Normal S1, S2; no murmurs, rubs, or gallops. Regular rate and rhythm.   RESPIRATORY: Breathing easily; breath sounds clear and equal bilaterally; no wheezes, rhonchi, or rales.  GI: Soft; non-distended; non-tender; no palpable organomegaly.   +fluid wave   EXT: No cyanosis or edema; N/V intact  SKIN: Normal for age and race; warm; dry; good turgor; no apparent lesions or rash.   NEURO: A & O x 3; face symmetric; grossly unremarkable.   PSYCHOLOGICAL: The patient’s mood and manner are appropriate.

## 2017-07-29 ENCOUNTER — TRANSCRIPTION ENCOUNTER (OUTPATIENT)
Age: 67
End: 2017-07-29

## 2017-07-29 VITALS
RESPIRATION RATE: 18 BRPM | SYSTOLIC BLOOD PRESSURE: 127 MMHG | HEART RATE: 100 BPM | OXYGEN SATURATION: 97 % | DIASTOLIC BLOOD PRESSURE: 77 MMHG | TEMPERATURE: 99 F

## 2017-07-29 LAB
ALBUMIN SERPL ELPH-MCNC: 2.7 G/DL — LOW (ref 3.3–5)
ALP SERPL-CCNC: 98 U/L — SIGNIFICANT CHANGE UP (ref 40–120)
ALT FLD-CCNC: 25 U/L — SIGNIFICANT CHANGE UP (ref 10–45)
ANION GAP SERPL CALC-SCNC: 13 MMOL/L — SIGNIFICANT CHANGE UP (ref 5–17)
APPEARANCE UR: CLEAR — SIGNIFICANT CHANGE UP
AST SERPL-CCNC: 51 U/L — HIGH (ref 10–40)
BACTERIA # UR AUTO: SIGNIFICANT CHANGE UP /HPF
BILIRUB SERPL-MCNC: 1.3 MG/DL — HIGH (ref 0.2–1.2)
BILIRUB UR-MCNC: NEGATIVE — SIGNIFICANT CHANGE UP
BUN SERPL-MCNC: 10 MG/DL — SIGNIFICANT CHANGE UP (ref 7–23)
CALCIUM SERPL-MCNC: 8.5 MG/DL — SIGNIFICANT CHANGE UP (ref 8.4–10.5)
CHLORIDE SERPL-SCNC: 102 MMOL/L — SIGNIFICANT CHANGE UP (ref 96–108)
CO2 SERPL-SCNC: 22 MMOL/L — SIGNIFICANT CHANGE UP (ref 22–31)
COLOR SPEC: YELLOW — SIGNIFICANT CHANGE UP
CREAT SERPL-MCNC: 0.6 MG/DL — SIGNIFICANT CHANGE UP (ref 0.5–1.3)
DIFF PNL FLD: (no result)
FERRITIN SERPL-MCNC: 31 NG/ML — SIGNIFICANT CHANGE UP (ref 30–400)
GLUCOSE SERPL-MCNC: 84 MG/DL — SIGNIFICANT CHANGE UP (ref 70–99)
GLUCOSE UR QL: NEGATIVE — SIGNIFICANT CHANGE UP
HAPTOGLOB SERPL-MCNC: 48 MG/DL — SIGNIFICANT CHANGE UP (ref 34–200)
HCT VFR BLD CALC: 29.3 % — LOW (ref 39–50)
HGB BLD-MCNC: 8.9 G/DL — LOW (ref 13–17)
INR BLD: 1.69 — HIGH (ref 0.88–1.16)
IRON SATN MFR SERPL: 22 UG/DL — LOW (ref 45–165)
IRON SATN MFR SERPL: 7 % — LOW (ref 16–55)
KETONES UR-MCNC: NEGATIVE — SIGNIFICANT CHANGE UP
LDH SERPL L TO P-CCNC: 185 U/L — SIGNIFICANT CHANGE UP (ref 50–242)
LEUKOCYTE ESTERASE UR-ACNC: NEGATIVE — SIGNIFICANT CHANGE UP
MAGNESIUM SERPL-MCNC: 1.4 MG/DL — LOW (ref 1.6–2.6)
MCHC RBC-ENTMCNC: 23.6 PG — LOW (ref 27–34)
MCHC RBC-ENTMCNC: 30.4 G/DL — LOW (ref 32–36)
MCV RBC AUTO: 77.7 FL — LOW (ref 80–100)
NITRITE UR-MCNC: NEGATIVE — SIGNIFICANT CHANGE UP
PH UR: 6 — SIGNIFICANT CHANGE UP (ref 5–8)
PHOSPHATE SERPL-MCNC: 3.5 MG/DL — SIGNIFICANT CHANGE UP (ref 2.5–4.5)
PLATELET # BLD AUTO: 126 K/UL — LOW (ref 150–400)
POTASSIUM SERPL-MCNC: 3.5 MMOL/L — SIGNIFICANT CHANGE UP (ref 3.5–5.3)
POTASSIUM SERPL-SCNC: 3.5 MMOL/L — SIGNIFICANT CHANGE UP (ref 3.5–5.3)
PROT SERPL-MCNC: 6.8 G/DL — SIGNIFICANT CHANGE UP (ref 6–8.3)
PROT UR-MCNC: NEGATIVE MG/DL — SIGNIFICANT CHANGE UP
PROTHROM AB SERPL-ACNC: 19 SEC — HIGH (ref 9.8–12.7)
RBC # BLD: 3.77 M/UL — LOW (ref 4.2–5.8)
RBC # FLD: 21.4 % — HIGH (ref 10.3–16.9)
RBC CASTS # UR COMP ASSIST: < 5 /HPF — SIGNIFICANT CHANGE UP
SODIUM SERPL-SCNC: 137 MMOL/L — SIGNIFICANT CHANGE UP (ref 135–145)
SP GR SPEC: 1.01 — SIGNIFICANT CHANGE UP (ref 1–1.03)
TIBC SERPL-MCNC: 318 UG/DL — SIGNIFICANT CHANGE UP (ref 220–430)
TRANSFERRIN SERPL-MCNC: 268 MG/DL — SIGNIFICANT CHANGE UP (ref 200–360)
UIBC SERPL-MCNC: 296 UG/DL — SIGNIFICANT CHANGE UP (ref 110–370)
UROBILINOGEN FLD QL: 1 E.U./DL — SIGNIFICANT CHANGE UP
WBC # BLD: 6.8 K/UL — SIGNIFICANT CHANGE UP (ref 3.8–10.5)
WBC # FLD AUTO: 6.8 K/UL — SIGNIFICANT CHANGE UP (ref 3.8–10.5)
WBC UR QL: < 5 /HPF — SIGNIFICANT CHANGE UP

## 2017-07-29 PROCEDURE — 99233 SBSQ HOSP IP/OBS HIGH 50: CPT | Mod: GC

## 2017-07-29 RX ORDER — INSULIN LISPRO 100/ML
8 VIAL (ML) SUBCUTANEOUS ONCE
Qty: 0 | Refills: 0 | Status: DISCONTINUED | OUTPATIENT
Start: 2017-07-29 | End: 2017-07-29

## 2017-07-29 RX ORDER — PROPRANOLOL HCL 160 MG
1 CAPSULE, EXTENDED RELEASE 24HR ORAL
Qty: 60 | Refills: 0 | OUTPATIENT
Start: 2017-07-29 | End: 2017-08-28

## 2017-07-29 RX ORDER — AZTREONAM 2 G
1 VIAL (EA) INJECTION
Qty: 30 | Refills: 0 | OUTPATIENT
Start: 2017-07-29 | End: 2017-08-28

## 2017-07-29 RX ADMIN — TAMSULOSIN HYDROCHLORIDE 0.4 MILLIGRAM(S): 0.4 CAPSULE ORAL at 07:16

## 2017-07-29 RX ADMIN — Medication 650 MILLIGRAM(S): at 01:00

## 2017-07-29 RX ADMIN — PANTOPRAZOLE SODIUM 40 MILLIGRAM(S): 20 TABLET, DELAYED RELEASE ORAL at 07:16

## 2017-07-29 RX ADMIN — Medication 1 MILLIGRAM(S): at 07:16

## 2017-07-29 NOTE — DISCHARGE NOTE ADULT - HOSPITAL COURSE
68 y/o M w/ pmhx of HTN, BPH, Etoh abuse, recent diagnosis of alcoholic cirrhosis presents to ED complaining of abdominal pain, fevers and chills at home. Patient was recently admitted last month for melanotic stools, underwent EGD which revealed esophageal varices portal hypertension discharged on PPI with GI followup. He presented with abdominal distention, tenderness, and subjective fever at home.      In the ED, vital signs temp: 99.4, BP: 138/78, HR: 90, RR:18, O2: 99% on room air. Labs significant for AST > ALT ratio, elevated t. bili and INR. Abdominal sono suggestive of cirrhosis portal HTN, splenomegaly. Diagnostic paracentesis was performed. Patient given Ceftriaxone 2g IVPB x 1. Tremor and tongue fasciculations noted on ED exam, so ativan 1mg x1 given out of concern for withdrawal.    Patient remained comfortable overnight, pain well-controlled.  Throughout morning, CIWA was 0.  Diagnostic tap revealed 1 pmn, so suspicion for SBP low.  Discharge on prophylaxis (bactrim) and variceal bleed prophylaxis (propranolol).

## 2017-07-29 NOTE — DISCHARGE NOTE ADULT - MEDICATION SUMMARY - MEDICATIONS TO TAKE
I will START or STAY ON the medications listed below when I get home from the hospital:    enalapril  --  by mouth   -- Indication: For Hypertension    tamsulosin 0.4 mg oral capsule  -- 1 cap(s) by mouth once a day  -- Indication: For BPH (benign prostatic hypertrophy)    propranolol 20 mg oral tablet  -- 1 tab(s) by mouth 2 times a day  -- It is very important that you take or use this exactly as directed.  Do not skip doses or discontinue unless directed by your doctor.  May cause drowsiness.  Alcohol may intensify this effect.  Use care when operating dangerous machinery.  Some non-prescription drugs may aggravate your condition.  Read all labels carefully.  If a warning appears, check with your doctor before taking.    -- Indication: For Alcoholic cirrhosis of liver with ascites    amLODIPine  --  by mouth   -- Indication: For Hypertension    Bactrim  mg-160 mg oral tablet  -- 1 tab(s) by mouth once a day  -- Avoid prolonged or excessive exposure to direct and/or artificial sunlight while taking this medication.  Finish all this medication unless otherwise directed by prescriber.  Medication should be taken with plenty of water.    -- Indication: For Alcoholic cirrhosis of liver with ascites    pantoprazole 40 mg oral delayed release tablet  -- 1 tab(s) by mouth once a day  -- It is very important that you take or use this exactly as directed.  Do not skip doses or discontinue unless directed by your doctor.  Obtain medical advice before taking any non-prescription drugs as some may affect the action of this medication.  Swallow whole.  Do not crush.    -- Indication: For Prophylaxis

## 2017-07-29 NOTE — DISCHARGE NOTE ADULT - CARE PLAN
Principal Discharge DX:	Alcoholic cirrhosis of liver with ascites  Goal:	To treat your abdominal pain  Secondary Diagnosis:	ETOH abuse  Secondary Diagnosis:	BPH (benign prostatic hypertrophy)  Secondary Diagnosis:	Hypertension Principal Discharge DX:	Alcoholic cirrhosis of liver with ascites  Goal:	To treat your abdominal pain  Instructions for follow-up, activity and diet:	When you came to the hospital you had a fever and abdominal pain, concerning for an infection.  The fluid in your abdomen, caused by your liver dysfunction, was tapped and did not demonstrate infection.  To prevent future infection or bleeding from blood vessels from your liver, you are being discharged on two new medications, Bactrim and propranolol.  You should start taking these medications as prescribed until you follow-up with your primary care provider.  Secondary Diagnosis:	ETOH abuse  Instructions for follow-up, activity and diet:	Continue avoiding alcohol and follow up with your primary care provider if you need additional resources to help you.  Secondary Diagnosis:	BPH (benign prostatic hypertrophy)  Secondary Diagnosis:	Hypertension Principal Discharge DX:	Alcoholic cirrhosis of liver with ascites  Goal:	To treat your abdominal pain  Instructions for follow-up, activity and diet:	When you came to the hospital you had a fever and abdominal pain, concerning for an infection.  The fluid in your abdomen, caused by your liver dysfunction, was tapped and did not demonstrate infection.  To prevent future infection or bleeding from blood vessels from your liver, you are being discharged on two new medications, Bactrim and propranolol.  You should start taking these medications as prescribed until you follow-up with your primary care provider.  Secondary Diagnosis:	ETOH abuse  Instructions for follow-up, activity and diet:	Continue avoiding alcohol and follow up with your primary care provider if you need additional resources to help you.  Secondary Diagnosis:	BPH (benign prostatic hypertrophy)  Instructions for follow-up, activity and diet:	You have an enlarged prostate for which you take medication at home (tamsulosin).  Continue taking this medication as prescribed and follow up with your primary care provider for management of this condition.  Secondary Diagnosis:	Hypertension  Goal:	To control your blood pressure  Instructions for follow-up, activity and diet:	You have high blood pressure.  Your blood pressure was well-controlled while you were in the hospital.  Please follow up with your primary care provider to further manage this condition. Principal Discharge DX:	Alcoholic cirrhosis of liver with ascites  Goal:	To treat your abdominal pain  Instructions for follow-up, activity and diet:	When you came to the hospital you had a fever and abdominal pain, concerning for an infection.  The fluid in your abdomen, caused by your liver dysfunction, was tapped and did not demonstrate infection.  To prevent future infection or bleeding from blood vessels from your liver, you are being discharged on two new medications, Bactrim and propranolol.  You should start taking these medications as prescribed until you follow-up with your primary care provider.  Secondary Diagnosis:	ETOH abuse  Instructions for follow-up, activity and diet:	Continue avoiding alcohol and follow up with your primary care provider if you need additional resources to help you.  Secondary Diagnosis:	BPH (benign prostatic hypertrophy)  Instructions for follow-up, activity and diet:	You have an enlarged prostate for which you take medication at home (tamsulosin).  Continue taking this medication as prescribed and follow up with your primary care provider for management of this condition.  Secondary Diagnosis:	Hypertension  Goal:	To control your blood pressure  Instructions for follow-up, activity and diet:	You have high blood pressure.  Your blood pressure was well-controlled while you were in the hospital.  Please follow up with your primary care provider to further manage this condition.  Secondary Diagnosis:	Esophageal varices determined by endoscopy

## 2017-07-29 NOTE — DISCHARGE NOTE ADULT - PLAN OF CARE
To treat your abdominal pain When you came to the hospital you had a fever and abdominal pain, concerning for an infection.  The fluid in your abdomen, caused by your liver dysfunction, was tapped and did not demonstrate infection.  To prevent future infection or bleeding from blood vessels from your liver, you are being discharged on two new medications, Bactrim and propranolol.  You should start taking these medications as prescribed until you follow-up with your primary care provider. Continue avoiding alcohol and follow up with your primary care provider if you need additional resources to help you. You have an enlarged prostate for which you take medication at home (tamsulosin).  Continue taking this medication as prescribed and follow up with your primary care provider for management of this condition. To control your blood pressure You have high blood pressure.  Your blood pressure was well-controlled while you were in the hospital.  Please follow up with your primary care provider to further manage this condition.

## 2017-07-29 NOTE — DISCHARGE NOTE ADULT - SECONDARY DIAGNOSIS.
ETOH abuse BPH (benign prostatic hypertrophy) Hypertension Esophageal varices determined by endoscopy

## 2017-07-29 NOTE — DISCHARGE NOTE ADULT - PATIENT PORTAL LINK FT
“You can access the FollowHealth Patient Portal, offered by [facility], by registering with the following website: [email or web address]”

## 2017-07-29 NOTE — DISCHARGE NOTE ADULT - ADDITIONAL INSTRUCTIONS
Please take your medications as instructed and follow-up with your primary care provider, Dr. Alondra Lopez, within the next two weeks to discuss your hospitalization and manage your chronic medical conditions.

## 2017-07-30 LAB
ALBUMIN FLD-MCNC: 0.9 G/DL — SIGNIFICANT CHANGE UP
BILIRUB FLD-MCNC: 0.3 MG/DL — SIGNIFICANT CHANGE UP

## 2017-08-01 DIAGNOSIS — E86.0 DEHYDRATION: ICD-10-CM

## 2017-08-01 DIAGNOSIS — I85.10 SECONDARY ESOPHAGEAL VARICES WITHOUT BLEEDING: ICD-10-CM

## 2017-08-01 DIAGNOSIS — K76.6 PORTAL HYPERTENSION: ICD-10-CM

## 2017-08-01 DIAGNOSIS — I10 ESSENTIAL (PRIMARY) HYPERTENSION: ICD-10-CM

## 2017-08-01 DIAGNOSIS — F50.9 EATING DISORDER, UNSPECIFIED: ICD-10-CM

## 2017-08-01 DIAGNOSIS — R10.13 EPIGASTRIC PAIN: ICD-10-CM
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
## 2017-08-01 DIAGNOSIS — Z87.891 PERSONAL HISTORY OF NICOTINE DEPENDENCE: ICD-10-CM

## 2017-08-01 DIAGNOSIS — R50.9 FEVER, UNSPECIFIED: ICD-10-CM

## 2017-08-01 DIAGNOSIS — K70.31 ALCOHOLIC CIRRHOSIS OF LIVER WITH ASCITES: ICD-10-CM

## 2017-08-01 DIAGNOSIS — N40.0 BENIGN PROSTATIC HYPERPLASIA WITHOUT LOWER URINARY TRACT SYMPTOMS: ICD-10-CM

## 2017-08-01 DIAGNOSIS — F10.239 ALCOHOL DEPENDENCE WITH WITHDRAWAL, UNSPECIFIED: ICD-10-CM

## 2017-08-01 DIAGNOSIS — R00.0 TACHYCARDIA, UNSPECIFIED: ICD-10-CM

## 2017-08-01 DIAGNOSIS — R16.1 SPLENOMEGALY, NOT ELSEWHERE CLASSIFIED: ICD-10-CM

## 2017-08-01 LAB
-  AMPICILLIN: SIGNIFICANT CHANGE UP
-  CIPROFLOXACIN: SIGNIFICANT CHANGE UP
-  NITROFURANTOIN: SIGNIFICANT CHANGE UP
-  TETRACYCLINE: SIGNIFICANT CHANGE UP
CULTURE RESULTS: NO GROWTH — SIGNIFICANT CHANGE UP
CULTURE RESULTS: SIGNIFICANT CHANGE UP
METHOD TYPE: SIGNIFICANT CHANGE UP
ORGANISM # SPEC MICROSCOPIC CNT: SIGNIFICANT CHANGE UP
ORGANISM # SPEC MICROSCOPIC CNT: SIGNIFICANT CHANGE UP
SPECIMEN SOURCE: SIGNIFICANT CHANGE UP
SPECIMEN SOURCE: SIGNIFICANT CHANGE UP

## 2017-08-02 LAB
CULTURE RESULTS: SIGNIFICANT CHANGE UP
CULTURE RESULTS: SIGNIFICANT CHANGE UP
SPECIMEN SOURCE: SIGNIFICANT CHANGE UP
SPECIMEN SOURCE: SIGNIFICANT CHANGE UP

## 2017-11-27 RX ORDER — TAMSULOSIN HYDROCHLORIDE 0.4 MG/1
1 CAPSULE ORAL
Qty: 0 | Refills: 0 | COMMUNITY

## 2017-11-27 RX ORDER — LISINOPRIL 2.5 MG/1
0 TABLET ORAL
Qty: 0 | Refills: 0 | COMMUNITY

## 2017-11-27 RX ORDER — AMLODIPINE BESYLATE 2.5 MG/1
0 TABLET ORAL
Qty: 0 | Refills: 0 | COMMUNITY

## 2018-06-08 ENCOUNTER — RX RENEWAL (OUTPATIENT)
Age: 68
End: 2018-06-08

## 2018-06-14 NOTE — H&P ADULT - DOES THIS PATIENT HAVE A HISTORY OF OR HAS BEEN DX WITH HEART FAILURE?
Spoke with Leydi regarding wound care RN consult.  Pt is being followed by outpatient wound clinic please enter wound care MD consult.    RN order canceled.   no

## 2019-04-15 ENCOUNTER — TRANSCRIPTION ENCOUNTER (OUTPATIENT)
Age: 69
End: 2019-04-15

## 2019-05-07 ENCOUNTER — TRANSCRIPTION ENCOUNTER (OUTPATIENT)
Age: 69
End: 2019-05-07

## 2019-07-10 ENCOUNTER — RX RENEWAL (OUTPATIENT)
Age: 69
End: 2019-07-10

## 2019-08-07 ENCOUNTER — RX RENEWAL (OUTPATIENT)
Age: 69
End: 2019-08-07

## 2020-02-06 ENCOUNTER — RX RENEWAL (OUTPATIENT)
Age: 70
End: 2020-02-06

## 2020-09-14 ENCOUNTER — RX RENEWAL (OUTPATIENT)
Age: 70
End: 2020-09-14

## 2020-11-05 PROBLEM — I10 ESSENTIAL (PRIMARY) HYPERTENSION: Chronic | Status: ACTIVE | Noted: 2017-06-06

## 2020-11-05 PROBLEM — N40.0 BENIGN PROSTATIC HYPERPLASIA WITHOUT LOWER URINARY TRACT SYMPTOMS: Chronic | Status: ACTIVE | Noted: 2017-06-06

## 2020-11-09 NOTE — INPATIENT CERTIFICATION FOR MEDICARE PATIENTS - IN ORDER TO MEET MEDICARE REQUIREMENTS.
The patient is a 66y Male complaining of pain, chest. In order to meet Medicare requirements, the clinical documentation must support the information cited in the admission order.  Please be sure to provide detailed and clear documentation about the following in the admitting note/history and physical:

## 2020-11-23 ENCOUNTER — APPOINTMENT (OUTPATIENT)
Dept: SURGERY | Facility: CLINIC | Age: 70
End: 2020-11-23
Payer: MEDICARE

## 2020-11-23 VITALS
SYSTOLIC BLOOD PRESSURE: 127 MMHG | TEMPERATURE: 97.3 F | HEART RATE: 77 BPM | HEIGHT: 71 IN | OXYGEN SATURATION: 97 % | WEIGHT: 162.5 LBS | DIASTOLIC BLOOD PRESSURE: 79 MMHG | BODY MASS INDEX: 22.75 KG/M2

## 2020-11-23 DIAGNOSIS — K74.60 UNSPECIFIED CIRRHOSIS OF LIVER: ICD-10-CM

## 2020-11-23 DIAGNOSIS — H40.9 UNSPECIFIED GLAUCOMA: ICD-10-CM

## 2020-11-23 DIAGNOSIS — R18.8 OTHER ASCITES: ICD-10-CM

## 2020-11-23 DIAGNOSIS — Z82.49 FAMILY HISTORY OF ISCHEMIC HEART DISEASE AND OTHER DISEASES OF THE CIRCULATORY SYSTEM: ICD-10-CM

## 2020-11-23 DIAGNOSIS — K70.31 ALCOHOLIC CIRRHOSIS OF LIVER WITH ASCITES: ICD-10-CM

## 2020-11-23 DIAGNOSIS — Z82.3 FAMILY HISTORY OF STROKE: ICD-10-CM

## 2020-11-23 DIAGNOSIS — Z78.9 OTHER SPECIFIED HEALTH STATUS: ICD-10-CM

## 2020-11-23 DIAGNOSIS — Z83.3 FAMILY HISTORY OF DIABETES MELLITUS: ICD-10-CM

## 2020-11-23 DIAGNOSIS — Z85.05 PERSONAL HISTORY OF MALIGNANT NEOPLASM OF LIVER: ICD-10-CM

## 2020-11-23 DIAGNOSIS — R10.9 UNSPECIFIED ABDOMINAL PAIN: ICD-10-CM

## 2020-11-23 PROCEDURE — 99203 OFFICE O/P NEW LOW 30 MIN: CPT

## 2020-11-25 PROBLEM — Z85.05 HISTORY OF HEPATOCELLULAR CARCINOMA: Status: ACTIVE | Noted: 2020-11-25

## 2020-11-25 RX ORDER — MAGNESIUM OXIDE 241.3 MG/1000MG
400 TABLET ORAL
Qty: 270 | Refills: 0 | Status: ACTIVE | COMMUNITY
Start: 2020-11-10

## 2020-11-25 RX ORDER — FUROSEMIDE 20 MG/1
20 TABLET ORAL
Qty: 180 | Refills: 0 | Status: ACTIVE | COMMUNITY
Start: 2020-09-15

## 2020-11-25 RX ORDER — OXYCODONE 5 MG/1
5 TABLET ORAL
Qty: 30 | Refills: 0 | Status: ACTIVE | COMMUNITY
Start: 2020-06-30

## 2020-11-25 RX ORDER — HYDROMORPHONE HYDROCHLORIDE 2 MG/1
2 TABLET ORAL
Qty: 40 | Refills: 0 | Status: ACTIVE | COMMUNITY
Start: 2020-08-06

## 2020-11-25 RX ORDER — AMLODIPINE BESYLATE 2.5 MG/1
2.5 TABLET ORAL
Qty: 90 | Refills: 0 | Status: ACTIVE | COMMUNITY
Start: 2020-06-29

## 2020-11-25 RX ORDER — TIZANIDINE 4 MG/1
4 TABLET ORAL
Qty: 90 | Refills: 0 | Status: ACTIVE | COMMUNITY
Start: 2020-06-18

## 2020-11-25 RX ORDER — ENOXAPARIN SODIUM 100 MG/ML
40 INJECTION SUBCUTANEOUS
Qty: 6 | Refills: 0 | Status: ACTIVE | COMMUNITY
Start: 2020-08-06

## 2020-11-25 RX ORDER — CHLORHEXIDINE GLUCONATE 213 G/1000ML
4 SOLUTION TOPICAL
Qty: 118 | Refills: 0 | Status: ACTIVE | COMMUNITY
Start: 2020-07-08

## 2020-11-25 RX ORDER — MUPIROCIN 20 MG/G
2 OINTMENT TOPICAL
Qty: 22 | Refills: 0 | Status: ACTIVE | COMMUNITY
Start: 2020-07-01

## 2020-11-25 RX ORDER — MULTIVIT-MIN/FOLIC/VIT K/LYCOP 400-300MCG
50 MCG TABLET ORAL
Qty: 30 | Refills: 0 | Status: ACTIVE | COMMUNITY
Start: 2020-07-08

## 2020-11-25 RX ORDER — PANTOPRAZOLE 40 MG/1
40 TABLET, DELAYED RELEASE ORAL
Qty: 14 | Refills: 0 | Status: ACTIVE | COMMUNITY
Start: 2020-08-06

## 2020-11-25 RX ORDER — LACTULOSE 10 G/15ML
10 SOLUTION ORAL
Qty: 1419 | Refills: 0 | Status: ACTIVE | COMMUNITY
Start: 2020-11-10

## 2020-11-25 RX ORDER — OXYCODONE AND ACETAMINOPHEN 5; 325 MG/1; MG/1
5-325 TABLET ORAL
Qty: 30 | Refills: 0 | Status: ACTIVE | COMMUNITY
Start: 2020-11-03

## 2020-11-25 RX ORDER — METHOCARBAMOL 500 MG/1
500 TABLET, FILM COATED ORAL
Qty: 21 | Refills: 0 | Status: ACTIVE | COMMUNITY
Start: 2020-08-06

## 2020-11-25 RX ORDER — DULOXETINE HYDROCHLORIDE 30 MG/1
30 CAPSULE, DELAYED RELEASE PELLETS ORAL
Qty: 30 | Refills: 0 | Status: ACTIVE | COMMUNITY
Start: 2020-11-03

## 2020-11-25 NOTE — ASSESSMENT
[FreeTextEntry1] : This is a 70 year old male. Discordance of abdominal wall likely secondary to denervation related to spinal surgery. No evidence of overt hernia at this time. Given his liver failure and ascites elective surgery to tighten this is not indicated. I have recommended he consult with an HPD surgeon as he reports to me that his current [facility] surgeon is no longer in the area. have referred to Dr. Evans to eval and continue to follow his HCC diagnosis and ascites. I answered all questions. Notably greater than 50% of todays 30 minute initial visit was spent on counseling and coordination of his care.

## 2020-11-25 NOTE — PHYSICAL EXAM
[JVD] : no jugular venous distention  [Normal Breath Sounds] : Normal breath sounds [Normal Heart Sounds] : normal heart sounds [Abdominal Masses] : No abdominal masses [Abdomen Tenderness] : ~T ~M No abdominal tenderness [Tender] : was nontender [Enlarged] : not enlarged [Alert] : alert [Oriented to Person] : oriented to person [Oriented to Place] : oriented to place [Oriented to Time] : oriented to time [Calm] : calm [de-identified] : AILEEN. Gray. Appropriate. Comfortable. [de-identified] : Pupils equal. No Scleral Icterus. NCAT. [de-identified] : Supple. Trachea midline. No overt lymphadenopathy. No JVD [de-identified] : Abdomen is soft, non tender and non distended. Do not appreciate any overt mass. There is a discordance of the abdominal wall on the right side. There is a clear ascitic fluid wave. There is no evidence of hernia. There is marked laxity of muscle right side. There are right flank incisions from spinal approach.

## 2020-11-25 NOTE — HISTORY OF PRESENT ILLNESS
[de-identified] : 70 year old male with history of alcoholic cirrhosis of the liver and ascites. Has had several paracentesis in the past. Also history of hepatocellular carcinoma that was treated with Y-90 at [facility]. History of scoliosis and back problems and s/p spinal surgery. Presents for evaluation of abdominal bulge of the right abdominal wall. No fevers, chills or shortness of breath. Eating and drinking normally. No abdominal pain associated with the bulge. Noted it after his spinal surgery.

## 2021-04-08 ENCOUNTER — RX RENEWAL (OUTPATIENT)
Age: 71
End: 2021-04-08

## 2021-09-03 ENCOUNTER — RX RENEWAL (OUTPATIENT)
Age: 71
End: 2021-09-03

## 2021-10-03 ENCOUNTER — TRANSCRIPTION ENCOUNTER (OUTPATIENT)
Age: 71
End: 2021-10-03

## 2021-10-24 ENCOUNTER — TRANSCRIPTION ENCOUNTER (OUTPATIENT)
Age: 71
End: 2021-10-24

## 2022-02-14 ENCOUNTER — RX RENEWAL (OUTPATIENT)
Age: 72
End: 2022-02-14

## 2022-08-17 NOTE — ED ADULT TRIAGE NOTE - BSA (M2)
You can access the FollowMyHealth Patient Portal offered by [facility] by registering at the following website: [email or web address]. By joining [facility]’s FollowMyHealth portal, you will also be able to view your health information using other applications (apps) compatible with our system.
2.08

## 2023-02-15 ENCOUNTER — RX RENEWAL (OUTPATIENT)
Age: 73
End: 2023-02-15

## 2023-06-20 NOTE — H&P ADULT - PROBLEM/PLAN-1
Trial lenses ordered 06/20/23. Acuvue Rachel for Astg OD lens not available in 120 axis. Dr. Apple changed trial lens brands to Biofinity Toric and Total 30 for Astig in original Rx requested.   DISPLAY PLAN FREE TEXT

## 2023-11-17 NOTE — PATIENT PROFILE ADULT. - CAREGIVER
Called and spoke with Tati Lemos to let him know he can start the levaquin NOW. Pt had cultures completed at the hospital today. Tati Lemos stated he understood and would start them asap. No